# Patient Record
Sex: FEMALE | Race: WHITE | NOT HISPANIC OR LATINO | Employment: OTHER | URBAN - METROPOLITAN AREA
[De-identification: names, ages, dates, MRNs, and addresses within clinical notes are randomized per-mention and may not be internally consistent; named-entity substitution may affect disease eponyms.]

---

## 2017-08-03 ENCOUNTER — OFFICE VISIT (OUTPATIENT)
Dept: ORTHOPEDIC SURGERY | Facility: CLINIC | Age: 57
End: 2017-08-03

## 2017-08-03 VITALS
HEART RATE: 86 BPM | DIASTOLIC BLOOD PRESSURE: 70 MMHG | WEIGHT: 162 LBS | HEIGHT: 64 IN | BODY MASS INDEX: 27.66 KG/M2 | SYSTOLIC BLOOD PRESSURE: 110 MMHG

## 2017-08-03 DIAGNOSIS — Z96.651 STATUS POST TOTAL RIGHT KNEE REPLACEMENT: Primary | ICD-10-CM

## 2017-08-03 DIAGNOSIS — M17.5 OTHER SECONDARY OSTEOARTHRITIS OF LEFT KNEE: ICD-10-CM

## 2017-08-03 PROCEDURE — 20610 DRAIN/INJ JOINT/BURSA W/O US: CPT | Performed by: PHYSICIAN ASSISTANT

## 2017-08-03 PROCEDURE — 99214 OFFICE O/P EST MOD 30 MIN: CPT | Performed by: PHYSICIAN ASSISTANT

## 2017-08-03 RX ORDER — BUPIVACAINE HYDROCHLORIDE 2.5 MG/ML
4 INJECTION, SOLUTION INFILTRATION; PERINEURAL
Status: COMPLETED | OUTPATIENT
Start: 2017-08-03 | End: 2017-08-03

## 2017-08-03 RX ORDER — METHYLPREDNISOLONE ACETATE 40 MG/ML
40 INJECTION, SUSPENSION INTRA-ARTICULAR; INTRALESIONAL; INTRAMUSCULAR; SOFT TISSUE
Status: COMPLETED | OUTPATIENT
Start: 2017-08-03 | End: 2017-08-03

## 2017-08-03 RX ORDER — FOLIC ACID 1 MG/1
1 TABLET ORAL DAILY
COMMUNITY

## 2017-08-03 RX ORDER — LIDOCAINE HYDROCHLORIDE 10 MG/ML
4 INJECTION, SOLUTION INFILTRATION; PERINEURAL
Status: COMPLETED | OUTPATIENT
Start: 2017-08-03 | End: 2017-08-03

## 2017-08-03 RX ADMIN — LIDOCAINE HYDROCHLORIDE 4 ML: 10 INJECTION, SOLUTION INFILTRATION; PERINEURAL at 15:36

## 2017-08-03 RX ADMIN — METHYLPREDNISOLONE ACETATE 40 MG: 40 INJECTION, SUSPENSION INTRA-ARTICULAR; INTRALESIONAL; INTRAMUSCULAR; SOFT TISSUE at 15:36

## 2017-08-03 RX ADMIN — BUPIVACAINE HYDROCHLORIDE 4 ML: 2.5 INJECTION, SOLUTION INFILTRATION; PERINEURAL at 15:36

## 2017-08-03 NOTE — PROGRESS NOTES
Procedure   Large Joint Arthrocentesis  Date/Time: 8/3/2017 3:36 PM  Consent given by: patient  Site marked: site marked  Timeout: Immediately prior to procedure a time out was called to verify the correct patient, procedure, equipment, support staff and site/side marked as required   Supporting Documentation  Indications: pain   Procedure Details  Location: knee - L knee  Preparation: Patient was prepped and draped in the usual sterile fashion  Needle size: 22 G  Approach: anterolateral  Medications administered: 4 mL bupivacaine; 4 mL lidocaine 1 %; 40 mg methylPREDNISolone acetate 40 MG/ML  Patient tolerance: patient tolerated the procedure well with no immediate complications

## 2017-08-03 NOTE — PROGRESS NOTES
Subjective     Pain of the Left Knee and Follow-up of the Right Knee (TKA 6/7/16)      Ashley Hankins is a 56 y.o. female.     History of Present Illness   Patient comes in for routine follow-up right total knee arthroplasties June 2016 with Dr. yudith Herzog and increased left knee pain for several months.  She reports the right knee is doing very well.  She has no pain or problems from that she works in a [employer] with many hours on her feet without any right knee problems.  The left knee has been bothering him for quite some time, however, it is gotten worse other past several months.  She complains of medial and anterior knee pain worse with stairs.  She has functional pain with occasional night pain.  She rates the pain 7 out of 10.  She denies any radiating pain or mechanical symptoms.  She is not had any prior treatment on the left knee.  She does have rheumatoid arthritis treated with methotrexate.  She is taken Celebrex in the past and wonders if she should return to taking it.  Allergies   Allergen Reactions   • Adhesive Tape    • Latex      Current Outpatient Prescriptions on File Prior to Visit   Medication Sig Dispense Refill   • aspirin 325 MG tablet Take 325 mg by mouth daily.     • buPROPion (WELLBUTRIN) 100 MG tablet Take 100 mg by mouth.     • Olmesartan Medoxomil-HCTZ (BENICAR HCT PO) Take 1 tablet by mouth daily.     • traZODone (DESYREL) 100 MG tablet Take 100 mg by mouth every night.     • [DISCONTINUED] acetaminophen (TYLENOL) 325 MG tablet Take 325 mg by mouth every 4 (four) hours as needed for mild pain (1-3).     • [DISCONTINUED] celecoxib (CeleBREX) 100 MG capsule Take 100 mg by mouth daily.     • [DISCONTINUED] cetirizine (ZyrTEC) 10 MG tablet Take 10 mg by mouth daily as needed for allergies.     • [DISCONTINUED] HYDROcodone-acetaminophen (NORCO)  MG per tablet Take 0.5 tablets by mouth every 4 (four) hours as needed for moderate pain (4-6).     • [DISCONTINUED] senna-docusate  "(PERICOLACE) 8.6-50 MG per tablet Take 2 tablets by mouth 3 (three) times a day as needed for constipation.       No current facility-administered medications on file prior to visit.      Social History     Social History   • Marital status:      Spouse name: N/A   • Number of children: N/A   • Years of education: N/A     Occupational History   • Not on file.     Social History Main Topics   • Smoking status: Never Smoker   • Smokeless tobacco: Never Used   • Alcohol use Yes      Comment: occasional   • Drug use: No   • Sexual activity: Defer     Other Topics Concern   • Not on file     Social History Narrative     Past Surgical History:   Procedure Laterality Date   •  SECTION      x2   • FRACTURE SURGERY     • JOINT REPLACEMENT Right     TKA 2016   • KIDNEY STONE SURGERY      removal     Family History   Problem Relation Age of Onset   • Depression Mother    • Hypertension Mother    • Cancer Mother    • Diabetes Mother    • Diabetes Father    • Hypertension Father    • Diabetes Brother      Past Medical History:   Diagnosis Date   • Hypertension    • IBS (irritable bowel syndrome)    • Kidney stones    • Rheumatoid arthritis    • SOB (shortness of breath)          Review of Systems   Constitutional: Positive for fatigue.   HENT: Negative.    Eyes: Negative.    Respiratory: Negative.    Cardiovascular: Positive for palpitations.   Gastrointestinal: Negative.    Endocrine: Negative.    Genitourinary: Negative.    Musculoskeletal: Positive for joint swelling and neck pain.   Skin: Negative.    Allergic/Immunologic: Negative.    Neurological: Negative.    Hematological: Negative.    Psychiatric/Behavioral: Negative.        The following portions of the patient's history were reviewed and updated as appropriate: allergies, current medications, past family history, past medical history, past social history, past surgical history and problem list.    Objective   /70  Pulse 86  Ht 64\" (162.6 cm)  " Wt 162 lb (73.5 kg)  BMI 27.81 kg/m2    Physical Exam:   GENERAL: Body habitus: normal weight for height    Lower extremity edema: Left: none; Right: none    Varicose veins:  Left: none; Right: none    Gait: normal     Mental Status:  awake and alert; oriented to person, place, and time    Voice:  clear  SKIN:  Normal    Hair Growth:  Right:normal; Left:  normal  HEENT: Head: Normocephalic, no lesions, without obvious abnormality.     Eyes: sclera anicteric  PULM:  Repiratory effort normal    Ortho Exam  Left Hip Exam  ---------  FLEXION CONTRACTURE: None  FLEXION: 110 degrees  INTERNAL ROTATION: 20 degrees at 90 degrees of flexion   EXTERNAL ROTATION: 40 degrees at 90 degrees of flexion    PAIN WITH HIP MOTION: no      Left Knee Exam  ----------  Knee Exam:  ----------  ALIGNMENT:  Left: neutral  ----------  RANGE OF MOTION:  Left: 0-120  LIGAMENTOUS STABILITY:   Left:stable to varus and valgus stress at 0 and 30 degrees without any evidence of laxity   ----------  STRENGTH:  KNEE FLEXION  Left 5/5  KNEE EXTENSION Left 5/5  ----------  PAIN WITH PALPATION: Left medial joint line  PAIN WITH KNEE ROM:  Left no  PATELLAR CREPITUS:  Left yes  ----------  SENSATION TO LIGHT TOUCH:  DEEP PERONEAL/SUPERFICIAL PERONEAL/SURAL/SAPHENOUS/TIBIAL:   Left intact  ----------  EDEMA:   Left:  no  ERYTHEMA:  ; Left:  no    Right Hip Exam  ----------  FLEXION CONTRACTURE: None  FLEXION: 110 degrees  INTERNAL ROTATION: 20 degrees at 90 degrees of flexion   EXTERNAL ROTATION: 40 degrees at 90 degrees of flexion    PAIN WITH HIP MOTION: no      Right Knee Exam  ----------  ALIGNMENT: Right: neutral----------  RANGE OF MOTION:  Right: 0-120  LIGAMENTOUS STABILITY:   Right:stable to varus and valgus stress at 0 and 30 degrees without any evidence of laxity----------  STRENGTH:  KNEE FLEXION Right 5/5  KNEE EXTENSION Right 5/5 ----------  PAIN WITH PALPATION: Right denies tenderness to palpation about the knee  PAIN WITH KNEE ROM: Right  no  PATELLAR CREPITUS: Right yes   ----------  SENSATION TO LIGHT TOUCH:  DEEP PERONEAL/SUPERFICIAL PERONEAL/SURAL/SAPHENOUS/TIBIAL:   Right intact----------  EDEMA:  Right:  no;  ERYTHEMA:  Right:  no;      Medical Decision Making    Data Review:   ordered and reviewed x-rays today    Assessment and Plan/ Diagnosis/Treatment options:   1.  Left knee arthritis.  I reviewed x-rays and clinical findings with the patient today.  On exam she has medial joint line tenderness with no evidence of new ligament or meniscal pathology.  I think her increasing left knee pain is secondary to knee arthritis.  She also is a rheumatoid treated with methotrexate.  We discussed treatment options including good shoe wear, ice, NSAIDs, low impact exercise, knee/hip strengthening, weight loss and the 4 fold multiplier on the joint, steroid injection, viscosupplementation and eventually knee replacement.  She has had steroid injection and been through conservative treatment on the right knee prior to surgery.  She would like steroid injection the left knee today.  I told her that we can do these every 3 or 4 months if it gives her good relief.  Plan today is steroid injection into the left knee she will return in 4 months or sooner if needed.    2.  Doing well status post right total knee arthroplasty.  I reviewed x-rays with the patient today.  X-rays show well-positioned total knee arthroplasty with no evidence of osteolysis, subsidence or fracture.  She is having no pain or problems from the right knee.  Plan is continued observation.  She'll return in 2 years with repeat x-rays of the right knee or sooner if needed.    Using sterile technique, the left knee was sterilely prepped with Hibiclens. Following a time out,  using a 22 gauge needle, the left knee was injected with 40mg Depo Medrol, 4 cc lidocaine and 4 cc marcaine.  Patient tolerated the procedure well.  No complications.

## 2017-12-05 ENCOUNTER — OFFICE VISIT (OUTPATIENT)
Dept: ORTHOPEDIC SURGERY | Facility: CLINIC | Age: 57
End: 2017-12-05

## 2017-12-05 DIAGNOSIS — M17.5 OTHER SECONDARY OSTEOARTHRITIS OF LEFT KNEE: Primary | ICD-10-CM

## 2017-12-05 PROBLEM — M19.90 DJD (DEGENERATIVE JOINT DISEASE): Status: ACTIVE | Noted: 2017-12-05

## 2017-12-05 PROCEDURE — 99213 OFFICE O/P EST LOW 20 MIN: CPT | Performed by: PHYSICIAN ASSISTANT

## 2017-12-05 PROCEDURE — 20610 DRAIN/INJ JOINT/BURSA W/O US: CPT | Performed by: PHYSICIAN ASSISTANT

## 2017-12-05 RX ORDER — METHYLPREDNISOLONE ACETATE 40 MG/ML
40 INJECTION, SUSPENSION INTRA-ARTICULAR; INTRALESIONAL; INTRAMUSCULAR; SOFT TISSUE
Status: COMPLETED | OUTPATIENT
Start: 2017-12-05 | End: 2017-12-05

## 2017-12-05 RX ORDER — LIDOCAINE HYDROCHLORIDE 10 MG/ML
4 INJECTION, SOLUTION INFILTRATION; PERINEURAL
Status: COMPLETED | OUTPATIENT
Start: 2017-12-05 | End: 2017-12-05

## 2017-12-05 RX ADMIN — LIDOCAINE HYDROCHLORIDE 4 ML: 10 INJECTION, SOLUTION INFILTRATION; PERINEURAL at 10:48

## 2017-12-05 RX ADMIN — METHYLPREDNISOLONE ACETATE 40 MG: 40 INJECTION, SUSPENSION INTRA-ARTICULAR; INTRALESIONAL; INTRAMUSCULAR; SOFT TISSUE at 10:48

## 2017-12-05 NOTE — PROGRESS NOTES
[facility] Note    Subjective     Patient: Ashley Hankins  : 1960    Primary Care Provider: Nasima Cota MD    Requesting Provider: As above    Pain of the Left Knee      History    Chief Complaint: Left knee pain    History of Present Illness: Patient returns today to discuss her increasing left knee pain.  She reports no new symptoms.  She complains of mainly medial functional pain with occasional night pain.  She had steroid injection in 2017 which gave her good relief for 3 months.  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  She rates the pain to be 5/10 with no radiating pain or mechanical symptoms.  She has recently run out of her methotrexate for her RA and continues to take intermittent ibuprofen.  She is not interested in replacement and like repeat injection today.  She denies any fever chills or constitutional symptoms.    Current Outpatient Prescriptions on File Prior to Visit   Medication Sig Dispense Refill   • aspirin 325 MG tablet Take 325 mg by mouth daily.     • buPROPion (WELLBUTRIN) 100 MG tablet Take 100 mg by mouth.     • celecoxib (CeleBREX) 200 MG capsule Take  by mouth Take As Directed.     • folic acid (FOLVITE) 1 MG tablet Take 1 mg by mouth Daily.     • methotrexate 2.5 MG tablet Take 2.5 mg by mouth. 7 on      • Olmesartan Medoxomil-HCTZ (BENICAR HCT PO) Take 1 tablet by mouth daily.     • traZODone (DESYREL) 100 MG tablet Take 100 mg by mouth every night.       No current facility-administered medications on file prior to visit.       Allergies   Allergen Reactions   • Adhesive Tape    • Latex       Past Medical History:   Diagnosis Date   • Carpal tunnel syndrome of right wrist    • Hypertension    • IBS (irritable bowel syndrome)    • Kidney stones    • Primary osteoarthritis of both knees    • Rheumatoid arthritis    • SOB (shortness of breath)      Past Surgical  History:   Procedure Laterality Date   •  SECTION      x2   • FRACTURE SURGERY     • JOINT REPLACEMENT Right     TKA 2016   • KIDNEY STONE SURGERY      removal     Family History   Problem Relation Age of Onset   • Depression Mother    • Hypertension Mother    • Cancer Mother    • Diabetes Mother    • Diabetes Father    • Hypertension Father    • Heart attack Father    • Diabetes Brother       Social History     Social History   • Marital status:      Spouse name: N/A   • Number of children: N/A   • Years of education: N/A     Occupational History   • Not on file.     Social History Main Topics   • Smoking status: Never Smoker   • Smokeless tobacco: Never Used   • Alcohol use Yes      Comment: occasional   • Drug use: No   • Sexual activity: Defer     Other Topics Concern   • Not on file     Social History Narrative        Review of Systems   Constitutional: Negative.    HENT: Negative.    Eyes: Negative.    Respiratory: Negative.    Cardiovascular: Negative.    Gastrointestinal: Negative.    Endocrine: Negative.    Genitourinary: Negative.    Musculoskeletal: Negative.         Joint Pain    Skin: Negative.    Allergic/Immunologic: Negative.    Neurological: Negative.    Hematological: Negative.    Psychiatric/Behavioral: Negative.        The following portions of the patient's history were reviewed and updated as appropriate: allergies, current medications, past family history, past medical history, past social history, past surgical history and problem list.      Objective      Physical Exam  There were no vitals taken for this visit.    There is no height or weight on file to calculate BMI.    GENERAL: Body habitus: normal weight for height    Lower extremity edema: Left: trace; Right: trace    Varicose veins:  Left: mild; Right: mild    Gait: normal     Mental Status:  awake and alert; oriented to person, place, and time    Voice:  clear  SKIN:  Normal    Hair Growth:  Right:normal; Left:   normal  HEENT: Head: Normocephalic, atraumatic,  without obvious abnormality.  eye: normal external eye, no icterus   PULM:  Repiratory effort normal    Ortho Exam  Left Knee Exam  ----------  Knee Exam:  ----------  ALIGNMENT:  Left: neutral  ----------  RANGE OF MOTION:  Left: Normal (0-130 degrees) with no extensor lag or flexion contracture  LIGAMENTOUS STABILITY:   Left:stable to varus and valgus stress at terminal extension and 30 degrees without any evidence of laxity   ----------  STRENGTH:  KNEE FLEXION  Left 5/5  KNEE EXTENSION Left 5/5  ----------  PAIN WITH PALPATION: Left medial joint line  PAIN WITH KNEE ROM:  Left no  PATELLAR CREPITUS:  Left yes  ----------  SENSATION TO LIGHT TOUCH:  DEEP PERONEAL/SUPERFICIAL PERONEAL/SURAL/SAPHENOUS/TIBIAL:   Left intact  ----------  EDEMA:   Left:  no  ERYTHEMA:  ; Left:  no  WOUNDS/INCISIONS: none, no overlying skin problems.      Medical Decision Making    Data Review:   ordered and reviewed x-rays today    Assessment:  1. Other secondary osteoarthritis of left knee        Plan:  1. Doing well with nonoperative treatment of left knee arthritis.  I reviewed clinical findings past and current treatment with the patient today.  On exam, she has medial joint line tenderness with no evidence of new ligament or meniscal pathology.  She's done well in the past with steroid injection which gave her 3 months relief.  I encouraged her to continue to call her rheumatologist for refill of her methotrexate so she doesn't get a flare of her RA.  Plan today is repeat steroid injection into the left knee.  She will return in 4 months or sooner if needed.    Using sterile technique, the left knee was sterilely prepped with Hibiclens. Following a time out,  using a 22 gauge needle, the left knee was injected with 40mg Depo Medrol, 4 cc lidocaine and 4 cc marcaine.  Patient tolerated the procedure well.  No complications.        Adrianne Ramirez PA-C  12/05/17  2:35 PM

## 2017-12-05 NOTE — PROGRESS NOTES
Procedure   Large Joint Arthrocentesis  Date/Time: 12/5/2017 10:48 AM  Consent given by: patient  Site marked: site marked  Timeout: Immediately prior to procedure a time out was called to verify the correct patient, procedure, equipment, support staff and site/side marked as required   Supporting Documentation  Indications: pain   Procedure Details  Location: knee - L knee  Preparation: Patient was prepped and draped in the usual sterile fashion  Needle size: 22 G  Approach: anterolateral  Medications administered: 4 mL lidocaine 1 %; 40 mg methylPREDNISolone acetate 40 MG/ML (c Bupivicaine .25% NDC 55150-167-10, Lot [id number], exp [id number])  Patient tolerance: patient tolerated the procedure well with no immediate complications

## 2018-05-01 ENCOUNTER — OFFICE VISIT (OUTPATIENT)
Dept: ORTHOPEDIC SURGERY | Facility: CLINIC | Age: 58
End: 2018-05-01

## 2018-05-01 VITALS
WEIGHT: 164 LBS | DIASTOLIC BLOOD PRESSURE: 74 MMHG | SYSTOLIC BLOOD PRESSURE: 144 MMHG | HEIGHT: 64 IN | BODY MASS INDEX: 28 KG/M2 | HEART RATE: 74 BPM

## 2018-05-01 DIAGNOSIS — M17.5 OTHER SECONDARY OSTEOARTHRITIS OF LEFT KNEE: Primary | ICD-10-CM

## 2018-05-01 PROCEDURE — 99213 OFFICE O/P EST LOW 20 MIN: CPT | Performed by: PHYSICIAN ASSISTANT

## 2018-05-01 PROCEDURE — 20610 DRAIN/INJ JOINT/BURSA W/O US: CPT | Performed by: PHYSICIAN ASSISTANT

## 2018-05-01 RX ORDER — BUPROPION HYDROCHLORIDE 150 MG/1
150 TABLET ORAL DAILY
Refills: 5 | COMMUNITY
Start: 2018-04-23

## 2018-05-01 RX ORDER — ALPRAZOLAM 1 MG/1
1 TABLET ORAL DAILY
Refills: 2 | COMMUNITY
Start: 2018-04-07

## 2018-05-01 RX ORDER — BUPIVACAINE HYDROCHLORIDE 2.5 MG/ML
4 INJECTION, SOLUTION INFILTRATION; PERINEURAL
Status: COMPLETED | OUTPATIENT
Start: 2018-05-01 | End: 2018-05-01

## 2018-05-01 RX ORDER — TRIAMCINOLONE ACETONIDE 40 MG/ML
40 INJECTION, SUSPENSION INTRA-ARTICULAR; INTRAMUSCULAR
Status: COMPLETED | OUTPATIENT
Start: 2018-05-01 | End: 2018-05-01

## 2018-05-01 RX ORDER — LIDOCAINE HYDROCHLORIDE 10 MG/ML
4 INJECTION, SOLUTION INFILTRATION; PERINEURAL
Status: COMPLETED | OUTPATIENT
Start: 2018-05-01 | End: 2018-05-01

## 2018-05-01 RX ADMIN — TRIAMCINOLONE ACETONIDE 40 MG: 40 INJECTION, SUSPENSION INTRA-ARTICULAR; INTRAMUSCULAR at 14:56

## 2018-05-01 RX ADMIN — LIDOCAINE HYDROCHLORIDE 4 ML: 10 INJECTION, SOLUTION INFILTRATION; PERINEURAL at 14:56

## 2018-05-01 RX ADMIN — BUPIVACAINE HYDROCHLORIDE 4 ML: 2.5 INJECTION, SOLUTION INFILTRATION; PERINEURAL at 14:56

## 2018-05-01 NOTE — PROGRESS NOTES
[facility] [place] [facility] Note    Subjective     Patient: Ashley Hankins  : 1960    Primary Care Provider: Nasima Cota MD    Requesting Provider: As above    Follow-up of the Left Knee (5 MONTHS/)      History    Chief Complaint: Left knee pain    History of Present Illness: Patient returns today for increasing left knee pain.  She is rheumatoid with known left knee arthritis.  She reports increasing pain for 3 weeks.  She reports she is started a new exercise program is been doing lower body and upper body routines for some time 3 days a week.  She reports no new symptoms.  She complains of medial functional pain with no night pain.  She reports last steroid injection in 17 lasted at least 4 months.  She is out of her methotrexate and Celebrex.    Current Outpatient Prescriptions on File Prior to Visit   Medication Sig Dispense Refill   • aspirin 325 MG tablet Take 325 mg by mouth daily.     • celecoxib (CeleBREX) 200 MG capsule Take  by mouth Take As Directed.     • folic acid (FOLVITE) 1 MG tablet Take 1 mg by mouth Daily.     • methotrexate 2.5 MG tablet Take 2.5 mg by mouth. 7 on      • Olmesartan Medoxomil-HCTZ (BENICAR HCT PO) Take 1 tablet by mouth daily.     • traZODone (DESYREL) 100 MG tablet Take 100 mg by mouth every night.     • [DISCONTINUED] buPROPion (WELLBUTRIN) 100 MG tablet Take 100 mg by mouth.       No current facility-administered medications on file prior to visit.       Allergies   Allergen Reactions   • Adhesive Tape    • Latex       Past Medical History:   Diagnosis Date   • Carpal tunnel syndrome of right wrist    • Hypertension    • IBS (irritable bowel syndrome)    • Kidney stones    • Primary osteoarthritis of both knees    • Rheumatoid arthritis    • SOB (shortness of breath)      Past Surgical History:   Procedure Laterality Date   •  SECTION      x2   • FRACTURE SURGERY     • JOINT REPLACEMENT Right     TKA 2016   • KIDNEY STONE  "SURGERY      removal     Family History   Problem Relation Age of Onset   • Depression Mother    • Hypertension Mother    • Cancer Mother    • Diabetes Mother    • Diabetes Father    • Hypertension Father    • Heart attack Father    • Diabetes Brother       Social History     Social History   • Marital status:      Spouse name: N/A   • Number of children: N/A   • Years of education: N/A     Occupational History   • Not on file.     Social History Main Topics   • Smoking status: Never Smoker   • Smokeless tobacco: Never Used   • Alcohol use Yes      Comment: occasional   • Drug use: No   • Sexual activity: Defer     Other Topics Concern   • Not on file     Social History Narrative   • No narrative on file        Review of Systems    The following portions of the patient's history were reviewed and updated as appropriate: allergies, current medications, past family history, past medical history, past social history, past surgical history and problem list.      Objective      Physical Exam  /74   Pulse 74   Ht 162.6 cm (64.02\")   Wt 74.4 kg (164 lb)   BMI 28.14 kg/m²     Body mass index is 28.14 kg/m².    GENERAL: Body habitus: normal weight for height    Lower extremity edema: Left: trace; Right: trace    Varicose veins:  Left: mild; Right: mild    Gait: normal     Mental Status:  awake and alert; oriented to person, place, and time    Voice:  clear  SKIN:  Normal    Hair Growth:  Right:normal; Left:  normal  HEENT: Head: Normocephalic, atraumatic,  without obvious abnormality.  eye: normal external eye, no icterus   PULM:  Repiratory effort normal    Ortho Exam  Left Hip Exam  ---------  FLEXION CONTRACTURE: None  FLEXION: 110 degrees  INTERNAL ROTATION: 20 degrees at 90 degrees of flexion   EXTERNAL ROTATION: 40 degrees at 90 degrees of flexion    PAIN WITH HIP MOTION: no      Left Knee Exam  ----------  Knee Exam:  ----------  ALIGNMENT:  Left: neutral  ----------  RANGE OF MOTION:  Left: Normal " (0-130 degrees) with no extensor lag or flexion contracture  LIGAMENTOUS STABILITY:   Left:stable to varus and valgus stress at terminal extension and 30 degrees without any evidence of laxity   ----------  STRENGTH:  KNEE FLEXION  Left 5/5  KNEE EXTENSION Left 5/5  ----------  PAIN WITH PALPATION: Left medial joint line  PAIN WITH KNEE ROM:  Left no  PATELLAR CREPITUS:  Left no  ----------  SENSATION TO LIGHT TOUCH:  DEEP PERONEAL/SUPERFICIAL PERONEAL/SURAL/SAPHENOUS/TIBIAL:   Left intact  ----------  EFFUSION:   Left:  no  ERYTHEMA:  ; Left:  no  WOUNDS/INCISIONS: none, no overlying skin problems.      Medical Decision Making    Data Review:   none    Assessment:  1. Other secondary osteoarthritis of left knee        Plan:  Left knee arthritis in the face of rheumatoid arthritis.  I reviewed clinical findings past and current treatment the patient.  Patient reports 4+ months relief from prior steroid injection.  She is not interested in replacement.  She would like to continue with repeat injection.  I encouraged her to return to her rheumatologist for refills of her Celebrex folic acid methotrexate.  Plan today is steroid injection in the left knee.  She'll return as needed.    Using sterile technique, the left knee was sterilely prepped with Hibiclens. Following a time out,  using a 22 gauge needle, the left knee was injected with 40mg Depo Medrol, 4 cc lidocaine and 4 cc marcaine.  Patient tolerated the procedure well.  No complications.        Adrianne Ramirez PA-C  05/01/18  3:45 PM

## 2018-05-01 NOTE — PROGRESS NOTES
Procedure   Large Joint Arthrocentesis  Date/Time: 5/1/2018 2:56 PM  Consent given by: patient  Site marked: site marked  Timeout: Immediately prior to procedure a time out was called to verify the correct patient, procedure, equipment, support staff and site/side marked as required   Supporting Documentation  Indications: pain   Procedure Details  Location: knee - L knee  Preparation: Patient was prepped and draped in the usual sterile fashion  Needle size: 22 G  Approach: anterolateral  Medications administered: 4 mL bupivacaine 0.25 %; 4 mL lidocaine 1 %; 40 mg triamcinolone acetonide 40 MG/ML  Patient tolerance: patient tolerated the procedure well with no immediate complications

## 2021-02-25 DIAGNOSIS — Z23 IMMUNIZATION DUE: ICD-10-CM

## 2024-05-28 ENCOUNTER — TELEPHONE (OUTPATIENT)
Age: 64
End: 2024-05-28
Payer: COMMERCIAL

## 2024-05-28 NOTE — TELEPHONE ENCOUNTER
Caller: Ashley Hankins    Relationship: Self    Best call back number:  [phone]    What is the best time to reach you: ANY    Who are you requesting to speak with (clinical staff, provider,  specific staff member): NO    Do you know the name of the person who called: NO    What was the call regarding: APPT    Is it okay if the provider responds through MyChart: NO, WOULD LIKE A CALL BACK

## 2024-05-30 PROBLEM — M06.9 RHEUMATOID ARTHRITIS: Status: ACTIVE | Noted: 2024-05-30

## 2024-06-03 ENCOUNTER — LAB (OUTPATIENT)
Facility: HOSPITAL | Age: 64
End: 2024-06-03
Payer: COMMERCIAL

## 2024-06-03 ENCOUNTER — OFFICE VISIT (OUTPATIENT)
Age: 64
End: 2024-06-03
Payer: COMMERCIAL

## 2024-06-03 ENCOUNTER — SPECIALTY PHARMACY (OUTPATIENT)
Age: 64
End: 2024-06-03
Payer: COMMERCIAL

## 2024-06-03 VITALS
SYSTOLIC BLOOD PRESSURE: 112 MMHG | DIASTOLIC BLOOD PRESSURE: 62 MMHG | HEIGHT: 64 IN | HEART RATE: 77 BPM | TEMPERATURE: 98.3 F | BODY MASS INDEX: 28.53 KG/M2 | WEIGHT: 167.1 LBS

## 2024-06-03 DIAGNOSIS — M05.79 RHEUMATOID ARTHRITIS INVOLVING MULTIPLE SITES WITH POSITIVE RHEUMATOID FACTOR: ICD-10-CM

## 2024-06-03 DIAGNOSIS — M85.89 OSTEOPENIA OF MULTIPLE SITES: ICD-10-CM

## 2024-06-03 DIAGNOSIS — M05.79 RHEUMATOID ARTHRITIS INVOLVING MULTIPLE SITES WITH POSITIVE RHEUMATOID FACTOR: Primary | ICD-10-CM

## 2024-06-03 DIAGNOSIS — Z79.899 HIGH RISK MEDICATION USE: ICD-10-CM

## 2024-06-03 DIAGNOSIS — D84.821 IMMUNOSUPPRESSION DUE TO DRUG THERAPY: ICD-10-CM

## 2024-06-03 DIAGNOSIS — Z79.899 IMMUNOSUPPRESSION DUE TO DRUG THERAPY: ICD-10-CM

## 2024-06-03 DIAGNOSIS — R53.82 CHRONIC FATIGUE: ICD-10-CM

## 2024-06-03 DIAGNOSIS — R76.8 POSITIVE ANA (ANTINUCLEAR ANTIBODY): ICD-10-CM

## 2024-06-03 DIAGNOSIS — M17.0 PRIMARY OSTEOARTHRITIS OF BOTH KNEES: ICD-10-CM

## 2024-06-03 LAB
ALBUMIN SERPL-MCNC: 4.2 G/DL (ref 3.5–5.2)
ALBUMIN/GLOB SERPL: 1.7 G/DL
ALP SERPL-CCNC: 65 U/L (ref 39–117)
ALT SERPL W P-5'-P-CCNC: 22 U/L (ref 1–33)
ANION GAP SERPL CALCULATED.3IONS-SCNC: 10.2 MMOL/L (ref 5–15)
AST SERPL-CCNC: 26 U/L (ref 1–32)
BASOPHILS # BLD AUTO: 0.03 10*3/MM3 (ref 0–0.2)
BASOPHILS NFR BLD AUTO: 0.4 % (ref 0–1.5)
BILIRUB SERPL-MCNC: 0.6 MG/DL (ref 0–1.2)
BUN SERPL-MCNC: 15 MG/DL (ref 8–23)
BUN/CREAT SERPL: 21.1 (ref 7–25)
CALCIUM SPEC-SCNC: 9 MG/DL (ref 8.6–10.5)
CHLORIDE SERPL-SCNC: 102 MMOL/L (ref 98–107)
CO2 SERPL-SCNC: 27.8 MMOL/L (ref 22–29)
CREAT SERPL-MCNC: 0.71 MG/DL (ref 0.57–1)
CRP SERPL-MCNC: <0.3 MG/DL (ref 0–0.5)
DEPRECATED RDW RBC AUTO: 45.7 FL (ref 37–54)
EGFRCR SERPLBLD CKD-EPI 2021: 95.7 ML/MIN/1.73
EOSINOPHIL # BLD AUTO: 0.16 10*3/MM3 (ref 0–0.4)
EOSINOPHIL NFR BLD AUTO: 2.3 % (ref 0.3–6.2)
ERYTHROCYTE [DISTWIDTH] IN BLOOD BY AUTOMATED COUNT: 13.3 % (ref 12.3–15.4)
ERYTHROCYTE [SEDIMENTATION RATE] IN BLOOD: 8 MM/HR (ref 0–30)
GLOBULIN UR ELPH-MCNC: 2.5 GM/DL
GLUCOSE SERPL-MCNC: 99 MG/DL (ref 65–99)
HCT VFR BLD AUTO: 41.3 % (ref 34–46.6)
HGB BLD-MCNC: 13.6 G/DL (ref 12–15.9)
IMM GRANULOCYTES # BLD AUTO: 0.01 10*3/MM3 (ref 0–0.05)
IMM GRANULOCYTES NFR BLD AUTO: 0.1 % (ref 0–0.5)
LYMPHOCYTES # BLD AUTO: 2.14 10*3/MM3 (ref 0.7–3.1)
LYMPHOCYTES NFR BLD AUTO: 31 % (ref 19.6–45.3)
MCH RBC QN AUTO: 30.9 PG (ref 26.6–33)
MCHC RBC AUTO-ENTMCNC: 32.9 G/DL (ref 31.5–35.7)
MCV RBC AUTO: 93.9 FL (ref 79–97)
MONOCYTES # BLD AUTO: 0.65 10*3/MM3 (ref 0.1–0.9)
MONOCYTES NFR BLD AUTO: 9.4 % (ref 5–12)
NEUTROPHILS NFR BLD AUTO: 3.92 10*3/MM3 (ref 1.7–7)
NEUTROPHILS NFR BLD AUTO: 56.8 % (ref 42.7–76)
NRBC BLD AUTO-RTO: 0 /100 WBC (ref 0–0.2)
PLATELET # BLD AUTO: 340 10*3/MM3 (ref 140–450)
PMV BLD AUTO: 9.6 FL (ref 6–12)
POTASSIUM SERPL-SCNC: 3.5 MMOL/L (ref 3.5–5.2)
PROT SERPL-MCNC: 6.7 G/DL (ref 6–8.5)
RBC # BLD AUTO: 4.4 10*6/MM3 (ref 3.77–5.28)
SODIUM SERPL-SCNC: 140 MMOL/L (ref 136–145)
WBC NRBC COR # BLD AUTO: 6.91 10*3/MM3 (ref 3.4–10.8)

## 2024-06-03 PROCEDURE — 85025 COMPLETE CBC W/AUTO DIFF WBC: CPT

## 2024-06-03 PROCEDURE — 36415 COLL VENOUS BLD VENIPUNCTURE: CPT

## 2024-06-03 PROCEDURE — 80053 COMPREHEN METABOLIC PANEL: CPT

## 2024-06-03 PROCEDURE — 85652 RBC SED RATE AUTOMATED: CPT

## 2024-06-03 PROCEDURE — 86140 C-REACTIVE PROTEIN: CPT

## 2024-06-03 PROCEDURE — 99214 OFFICE O/P EST MOD 30 MIN: CPT | Performed by: INTERNAL MEDICINE

## 2024-06-03 RX ORDER — FOLIC ACID 1 MG/1
1 TABLET ORAL DAILY
Qty: 30 TABLET | Refills: 3 | Status: SHIPPED | OUTPATIENT
Start: 2024-06-03

## 2024-06-03 RX ORDER — METHOTREXATE 2.5 MG/1
17.5 TABLET ORAL WEEKLY
Qty: 35 TABLET | Refills: 3 | Status: SHIPPED | OUTPATIENT
Start: 2024-06-03

## 2024-06-03 RX ORDER — CERTOLIZUMAB PEGOL 400 MG
200 KIT SUBCUTANEOUS
Qty: 2 EACH | Refills: 3 | Status: SHIPPED | OUTPATIENT
Start: 2024-06-03

## 2024-06-03 NOTE — ASSESSMENT & PLAN NOTE
Onset 2009. RF, CCP, and SANTHOSH +. Methotrexate started by primary care provider 2016.  Cimzia started 9/2022.  .  RA is doing well with Cimzia.    Tender joint count is 0 , swollen joint count is 0 , physician global is 1 , visual analog pain scale is 2 , pt global is 2.  CDAI is 3-low disease activity.  Continue MTX and q 6 week labs  Continue Cimzia.   F/u in 3 months

## 2024-06-03 NOTE — PROGRESS NOTES
Office Follow Up      Date: 06/03/2024   Patient Name: Ashley Hankins  MRN: [id number]  YOB: 1960    Referring Physician: Tonia Romo APRN     Chief Complaint:   Chief Complaint   Patient presents with    Rheumatoid Arthritis       History of Present Illness: Ashley Hankins is a 63 y.o. female who is here today for follow up on rheumatoid arthritis.  Doing well on injectable Cimzia and MTX.   She is tolerating Cimzia. No joint swelling.  No SE's or infections.  No problems with MTX. Stopped Celebrex without worsening.    diagnosed with pancreatic cancer. Had surgery 7/20/23.  He has liver mets and is on Keytruda.  Pain scale: 2/10. The problem has not changed. The primary symptoms reported include: pain and stiffness. The following symptoms are not reported:  swelling. The patient assesses the interval disease activity as: stable. The patient's assessment of treatment is: helping greatly. The locations affected since last visit are bilateral knee. Associated symptoms include AM stiffness (5 Minutes). Pertinent negatives include fever, infection, fatigue, eye symptoms, change in vision, sicca complaints, mouth sores/lesions, skin lesion(s), chest pain, changing cough, edema, joint swelling and abdominal pain.   Subjective   Review of Systems: Review of Systems   Constitutional:  Negative for chills, fatigue, fever and unexpected weight loss.   HENT:  Negative for dental problem, mouth sores, sinus pressure and swollen glands.    Eyes:  Negative for photophobia, pain and redness.   Respiratory:  Negative for apnea, cough, chest tightness and shortness of breath.    Cardiovascular:  Negative for chest pain and leg swelling.   Gastrointestinal:  Negative for abdominal pain, diarrhea, nausea and GERD.   Genitourinary:  Negative for dysuria and hematuria.   Musculoskeletal:  Positive for back pain.        Joint pain  Morning stiffness   Skin:  Negative for dry skin,  rash, skin lesions and bruise.   Neurological:  Negative for dizziness, seizures, syncope, weakness, headache and memory problem.   Hematological:  Negative for adenopathy. Does not bruise/bleed easily.   Psychiatric/Behavioral:  Positive for sleep disturbance. Negative for suicidal ideas, depressed mood and stress. The patient is not nervous/anxious.    All other systems reviewed and are negative.       Medications:   Current Outpatient Medications:     ALPRAZolam (XANAX) 1 MG tablet, Take 1 tablet by mouth Daily., Disp: , Rfl: 2    atorvastatin (LIPITOR) 10 MG tablet, Take 0.5 tablets by mouth Daily., Disp: , Rfl:     buPROPion XL (WELLBUTRIN XL) 150 MG 24 hr tablet, Take 1 tablet by mouth Daily. as directed, Disp: , Rfl: 5    Certolizumab Pegol (Cimzia) 2 X 200 MG kit injection, Inject 1 mL under the skin into the appropriate area as directed Every 14 (Fourteen) Days. Administer CIMZIA 400 mg (200 mgmg SQ week 0, 2, 4 then every 4 weeks, Disp: 2 each, Rfl: 3    folic acid (FOLVITE) 1 MG tablet, Take 1 tablet by mouth Daily., Disp: 30 tablet, Rfl: 3    methotrexate 2.5 MG tablet, Take 7 tablets by mouth 1 (One) Time Per Week. 7 on Sunday, Disp: 35 tablet, Rfl: 3    Olmesartan Medoxomil-HCTZ (BENICAR HCT PO), Take 1 tablet by mouth daily., Disp: , Rfl:     traZODone (DESYREL) 100 MG tablet, Take 0.5 tablets by mouth Every Night., Disp: , Rfl:     aspirin 325 MG tablet, Take 1 tablet by mouth Daily., Disp: , Rfl:     Allergies:   Allergies   Allergen Reactions    Adhesive Tape Hives     Pt states that she had this when she was pregnant years ago     Latex Hives     Pt states that she had this when she was pregnant years ago          I have reviewed and updated the patient's chief complaint, history of present illness, review of systems, past medical history, surgical history, family history, social history, medications and allergy list as appropriate.     Objective    Vital Signs:   Vitals:    06/03/24 0851   BP:  "112/62   Pulse: 77   Temp: 98.3 °F (36.8 °C)   Weight: 75.8 kg (167 lb 1.6 oz)   Height: 162.6 cm (64.02\")   PainSc:   2   PainLoc: Knee  Comment: Bilateral     Body mass index is 28.67 kg/m².    Physical Exam:  GENERAL:  The patient is well-developed and well nourished.  Cooperative and oriented x3.  Affect is normal.  Hydration appears normal.  HEENT:  Normocephalic and atraumatic.  No notable alopecia.  No facial rash.  Lids and conjunctiva are normal.  Pupils are equal and sclerae are clear.  I see no oral or nasal ulcers.  Lips, teeth, and gums are within normal limits.  Oropharynx is clear.  NECK:  Neck is supple without adenopathy, masses, or thyromegaly.  CARDIOVASCULAR:  Normal S1, S2.  No murmurs are heard.  LUNGS:  Clear to auscultation and percussion.  ABDOMEN: Soft and nontender without HSM.   EXTREMITIES:  No edema.  No cyanosis or clubbing.   SKIN:  Palpation and inspection are normal.  She has no psoriatic lesions, nodules, nail pits, or rashes.  NEUROLOGIC:  Gait is normal.    MUSCULOSKELETAL:  Complete joint exam was performed.  There was full range of motion of the shoulders, elbows, wrists, and hands without notable deformities, soft tissue swelling, synovitis, or atrophy except as noted. Minimal degenerative changes are seen in the fingers.  Hips have good flexion and internal and external rotation.  Knees have no palpable effusions.  There is a well-healed right anterior knee scar from total joint replacement.  Left knee has slight limitation of flexion but full extension.  She has mild pain at full flexion.  Ankles have no soft tissue swelling or synovitis or major deformities.   BACK:  Straight without scoliosis.  Joint Exam 06/03/2024     The following joints were examined and normal:   Left Glenohumeral, Right Glenohumeral, Left Elbow, Right Elbow, Left Wrist, Right Wrist, Left MCP 1, Right MCP 1, Left MCP 2, Right MCP 2, Left MCP 3, Right MCP 3, Left MCP 4, Right MCP 4, Left MCP 5, Right " MCP 5, Left IP (thumb), Right IP (thumb), Left PIP 2 (finger), Right PIP 2 (finger), Left PIP 3 (finger), Right PIP 3 (finger), Left PIP 4 (finger), Right PIP 4 (finger), Left PIP 5 (finger), Right PIP 5 (finger), Left Knee, Right Knee       Patient Global: 2 mm  Provider Global: 1 mm    Assessment / Plan      Assessment & Plan  Rheumatoid arthritis involving multiple sites with positive rheumatoid factor  Onset 2009. RF, CCP, and SANTHOSH +. Methotrexate started by primary care provider 2016.  Cimzia started 9/2022.  .  RA is doing well with Cimzia.    Tender joint count is 0 , swollen joint count is 0 , physician global is 1 , visual analog pain scale is 2 , pt global is 2.  CDAI is 3-low disease activity.  Continue MTX and q 6 week labs  Continue Cimzia.   F/u in 3 months  Primary osteoarthritis of both knees  Status post right total knee replacement 2017.  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  High risk medication use  Methotrexate.  She understands the need for every 6 to 8-week labs.  Positive SANTHOSH (antinuclear antibody)  No evidence of associated disease.  Osteopenia of multiple sites  Followed by her PCP.  She is on alendronate.  Immunosuppression due to drug therapy  Cimzia.  No infections.  Chronic fatigue      Orders Placed This Encounter   Procedures    CBC Auto Differential    Comprehensive Metabolic Panel    C-reactive Protein    Sedimentation Rate     New Medications Ordered This Visit   Medications    Certolizumab Pegol (Cimzia) 2 X 200 MG kit injection     Sig: Inject 1 mL under the skin into the appropriate area as directed Every 14 (Fourteen) Days. Administer CIMZIA 400 mg (200 mgmg SQ week 0, 2, 4 then every 4 weeks     Dispense:  2 each     Refill:  3    folic acid (FOLVITE) 1 MG tablet     Sig: Take 1 tablet by mouth Daily.     Dispense:  30 tablet     Refill:  3    methotrexate 2.5 MG tablet     Sig: Take 7 tablets by mouth 1 (One) Time Per Week. 7 on Sunday      Dispense:  35 tablet     Refill:  3          Follow Up:   Return in about 3 months (around 9/3/2024).        Arturo Luna MD  [facility]

## 2024-06-03 NOTE — ASSESSMENT & PLAN NOTE
Status post right total knee replacement 2017.  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   WDL

## 2024-06-04 ENCOUNTER — SPECIALTY PHARMACY (OUTPATIENT)
Dept: GENERAL RADIOLOGY | Facility: HOSPITAL | Age: 64
End: 2024-06-04
Payer: COMMERCIAL

## 2024-06-04 RX ORDER — CERTOLIZUMAB PEGOL 400 MG
400 KIT SUBCUTANEOUS ONCE
Qty: 1 EACH | Refills: 5 | Status: SHIPPED | OUTPATIENT
Start: 2024-06-04 | End: 2024-06-06

## 2024-06-04 RX ORDER — CERTOLIZUMAB PEGOL 400 MG
400 KIT SUBCUTANEOUS ONCE
Qty: 1 EACH | Refills: 5 | Status: SHIPPED | OUTPATIENT
Start: 2024-06-04 | End: 2024-06-04 | Stop reason: SDUPTHER

## 2024-06-04 NOTE — PROGRESS NOTES
Specialty Pharmacy Patient Management Program  Rheumatology Initial Assessment     Ashley Hankins was referred by an Rheumatology provider to the Rheumatology Patient Management program offered by [facility] for Rheumatoid Arthritis on 06/04/24.  An initial outreach was conducted, including assessment of therapy appropriateness and specialty medication education for Cimzia. The patient was introduced to services offered by [facility], including: regular assessments, refill coordination, curbside pick-up or mail order delivery options, prior authorization maintenance, and financial assistance programs as applicable. The patient was also provided with contact information for the pharmacy team.     Insurance Coverage & Financial Support  [employer]      Relevant Past Medical History and Comorbidities  Relevant medical history and concomitant health conditions were discussed with the patient. The patient's chart has been reviewed for relevant past medical history and comorbid conditions and updated as necessary.  Past Medical History:   Diagnosis Date    Carpal tunnel syndrome of right wrist     GERD (gastroesophageal reflux disease)     High cholesterol     Hypertension     IBS (irritable bowel syndrome)     Kidney stones     Primary osteoarthritis of both knees     Rheumatoid arthritis     SOB (shortness of breath)      Social History     Socioeconomic History    Marital status:    Tobacco Use    Smoking status: Never     Passive exposure: Never    Smokeless tobacco: Never   Vaping Use    Vaping status: Never Used   Substance and Sexual Activity    Alcohol use: Yes     Comment: occasional    Drug use: No    Sexual activity: Defer       Problem list reviewed by Gianni Diego, PharmD on 6/4/2024 at 10:37 AM    Allergies  Known allergies and reactions were discussed with the patient. The patient's chart has been reviewed for  allergy information and  "updated as necessary.   Allergies   Allergen Reactions    Adhesive Tape Hives     Pt states that she had this when she was pregnant years ago     Latex Hives     Pt states that she had this when she was pregnant years ago          Allergies reviewed by Gianni Diego, [place] on 6/4/2024 at 10:36 AM    Relevant Laboratory Values  Relevant laboratory values were discussed with the patient. The following specialty medication dose adjustment(s) are recommended: n/a    Lab Results   Component Value Date    HGBA1C 5.4 05/26/2016     Lab Results   Component Value Date    GLUCOSE 99 06/03/2024    CALCIUM 9.0 06/03/2024     06/03/2024    K 3.5 06/03/2024    CO2 27.8 06/03/2024     06/03/2024    BUN 15 06/03/2024    CREATININE 0.71 06/03/2024    BCR 21.1 06/03/2024    ANIONGAP 10.2 06/03/2024     No results found for: \"CHOL\", \"CHLPL\", \"TRIG\", \"HDL\", \"LDL\", \"LDLDIRECT\"      Current Medication List  This medication list has been reviewed with the patient and evaluated for any interactions or necessary modifications/recommendations, and updated to include all prescription medications, OTC medications, and supplements the patient is currently taking.  This list reflects what is contained in the patient's profile, which has also been marked as reviewed to communicate to other providers it is the most up to date version of the patient's current medication therapy.     Current Outpatient Medications:     Certolizumab Pegol (Cimzia) 2 X 200 MG kit injection, Inject 2 mL under the skin into the appropriate area as directed 1 (One) Time for 1 dose. Every 28 days, Disp: 1 each, Rfl: 5    ALPRAZolam (XANAX) 1 MG tablet, Take 1 tablet by mouth Daily., Disp: , Rfl: 2    aspirin 325 MG tablet, Take 1 tablet by mouth Daily., Disp: , Rfl:     atorvastatin (LIPITOR) 10 MG tablet, Take 0.5 tablets by mouth Daily., Disp: , Rfl:     buPROPion XL (WELLBUTRIN XL) 150 MG 24 hr tablet, Take 1 tablet by mouth Daily. as directed, " Disp: , Rfl: 5    Certolizumab Pegol (Cimzia) 2 X 200 MG kit injection, Inject 1 mL under the skin into the appropriate area as directed Every 14 (Fourteen) Days. Administer CIMZIA 400 mg (200 mgmg SQ week 0, 2, 4 then every 4 weeks, Disp: 2 each, Rfl: 3    folic acid (FOLVITE) 1 MG tablet, Take 1 tablet by mouth Daily., Disp: 30 tablet, Rfl: 3    methotrexate 2.5 MG tablet, Take 7 tablets by mouth 1 (One) Time Per Week. 7 on Sunday, Disp: 35 tablet, Rfl: 3    Olmesartan Medoxomil-HCTZ (BENICAR HCT PO), Take 1 tablet by mouth daily., Disp: , Rfl:     traZODone (DESYREL) 100 MG tablet, Take 0.5 tablets by mouth Every Night., Disp: , Rfl:     Medicines reviewed by Gianni Diego, PharmD on 6/4/2024 at 10:37 AM  Medicines reviewed by Gianni Diego, PharmD on 6/4/2024 at 10:37 AM    Drug Interactions  N/a    Initial Education Provided for Specialty Medication  The patient has been provided with the following education and any applicable administration techniques (i.e. self-injection) have been demonstrated for the therapies indicated. All questions and concerns have been addressed prior to the patient receiving the medication, and the patient has verbalized comprehension of the education and any materials provided. Additional patient education shall be provided and documented upon request by the patient, provider, or payer.       Cimzia (certolizumab pegol)        Medication Expectations   Why am I taking this medication? You are taking this medication for plaque psoriasis, psoriatic arthritis, ankylosing spondylitis, nonradiographic axial spondyloarthritis, or Crohn's disease.   What should I expect while on this medication? You should expect a decrease in the frequency and severity of symptoms.   How does the medication work? Certolizumab pegol is a monoclonal antibody that binds to and neutralizes tumor necrosis factor alpha (TNF-alpha).   How long will I be on this medication for? The amount of  time you will be on this medication will be determined by your doctor and your response to the medication.    How do I take this medication? Take as directed on your prescription label. This medication is a subcutaneous injection given in the fatty part of the skin on the top of the thigh or stomach area. Allow to sit at room temperature for 30 minutes prior to injection.   What are some possible side effects? Injection site reactions and hypersensitivity reactions, headache, diarrhea, signs of a common cold, stomach pain, or upset stomach.   What happens if I miss a dose? If you miss a dose, take it as soon as you remember. If it is close to the time for your next dose, skip the missed dose and go back to your normal time.              Medication Safety   What are things I should warn my doctor immediately about? Allergic reaction such has hives or trouble breathing. If you develop symptoms of infection such as a cough that does not go away, weight loss, changes in how often you urinate, changes in sweating, muscle pain or weakness, or severe dizziness.     What are things that I should be cautious of? Injection site reaction and headache. You may have more chance of getting an infection.  Wash your hands often and stay away from people with infections, colds, or flu.   What are some medications that can interact with this one? Immunosuppressants and vaccines.            Medication Storage/Handling   How should I handle this medication? Keep this medication our of reach of pets/children in original container. Store in the original container to protect from light. Do not freeze. Do not inject where the skin is tender, bruised, red, hard, or has scars or stretch marks. Rotate injection sites.   How does this medication need to be stored? Store in refrigerator and keep dry. If needed, you may store at room temperature for up to 7 days but do not return to the refrigerator if unused.    How should I dispose of this  medication? You can dispose of the syringe in a sharps container, and if this is not available you may use an empty hard plastic container such as a milk jug or tide container. Discard any unused portion or if stored outside of refrigerator > 7 days.            Resources/Support   How can I remind myself to take this medication? You can download a reminder troy on your phone or use a calandar  to help with your injection.   Is financial support available?  Yes, [employer] program can provide co-pay assisstance if you have commercial insurance or patient assistance if you have Medicare or no insurance.    Which vaccines are recommended for me? Talk to your doctor about these vaccines: Flu, Coronavirus (COVID-19), Pneumococcal (pneumonia), Tdap, Hepatitis B, Zoster (shingles).              Adherence and Self-Administration  Adherence related to the patient's specialty therapy was discussed with the patient. The Adherence segment of this outreach has been reviewed and updated.     Is there a concern with patient's ability to self administer the medication correctly and without issue?: No  Were any potential barriers to adherence identified during the initial assessment or patient education?: No  Are there any concerns regarding the patient's understanding of the importance of medication adherence?: No  Methods for Supporting Patient Adherence and/or Self-Administration: n/a    Open Medication Therapy Problems  No medication therapy recommendations to display    Goals of Therapy  Goals related to the patient's specialty therapy were discussed with the patient. The Patient Goals segment of this outreach has been reviewed and updated.   Goals Addressed Today        Specialty Pharmacy General Goal      Reduce number of flares and pain score.               Reassessment Plan & Follow-Up  1. Medication Therapy Changes: Cimzia 400mg once every 28 days  2. Related Plans, Therapy Recommendations, or Therapy Problems to Be  Addressed: Patient currently on therapy and does not have any questions or concerns about medication or administration.  Patient has been without medication due to transition of ACL to [place].  Advised patient to call clinic with any questions or concerns.   3. Pharmacist to perform regular assessments no more than (6) months from the previous assessment.  4. Care Coordinator to set up future refill outreaches, coordinate prescription delivery, and escalate clinical questions to pharmacist.  5. Welcome information and patient satisfaction survey to be sent by specialty pharmacy team with patient's initial fill.    Attestation  Therapeutic appropriateness: Appropriate   I attest the patient was actively involved in and has agreed to the above plan of care. If the prescribed therapy is at any point deemed not appropriate based on the current or future assessments, a consultation will be initiated with the patient's specialty care provider to determine the best course of action. The revised plan of therapy will be documented along with any required assessments and/or additional patient education provided.     Gianni Diego, PharmD  Clinical Specialty Pharmacist, Rheumatology  6/4/2024  10:58 EDT

## 2024-06-25 ENCOUNTER — SPECIALTY PHARMACY (OUTPATIENT)
Dept: GENERAL RADIOLOGY | Facility: HOSPITAL | Age: 64
End: 2024-06-25
Payer: COMMERCIAL

## 2024-06-25 ENCOUNTER — SPECIALTY PHARMACY (OUTPATIENT)
Age: 64
End: 2024-06-25
Payer: COMMERCIAL

## 2024-06-25 RX ORDER — CERTOLIZUMAB PEGOL 200 MG/ML
400 INJECTION, SOLUTION SUBCUTANEOUS
Qty: 2 ML | Refills: 3 | Status: SHIPPED | OUTPATIENT
Start: 2024-06-25

## 2024-07-24 ENCOUNTER — SPECIALTY PHARMACY (OUTPATIENT)
Age: 64
End: 2024-07-24
Payer: COMMERCIAL

## 2024-08-05 RX ORDER — CELECOXIB 200 MG/1
200 CAPSULE ORAL DAILY PRN
Qty: 30 CAPSULE | Refills: 3 | Status: SHIPPED | OUTPATIENT
Start: 2024-08-05

## 2024-08-15 RX ORDER — CERTOLIZUMAB PEGOL 200 MG/ML
400 INJECTION, SOLUTION SUBCUTANEOUS
Qty: 2 ML | Refills: 3 | Status: SHIPPED | OUTPATIENT
Start: 2024-08-15

## 2024-08-15 NOTE — TELEPHONE ENCOUNTER
Patient called stating that she hasn't received her Cimzia yet. She states she got it filled through [facility] one time but she would prefer to receive it through [facility]. New rx sent to [facility].

## 2024-08-17 ENCOUNTER — APPOINTMENT (OUTPATIENT)
Dept: GENERAL RADIOLOGY | Facility: HOSPITAL | Age: 64
End: 2024-08-17
Payer: COMMERCIAL

## 2024-08-17 ENCOUNTER — HOSPITAL ENCOUNTER (EMERGENCY)
Facility: HOSPITAL | Age: 64
Discharge: HOME | End: 2024-08-17
Attending: EMERGENCY MEDICINE
Payer: COMMERCIAL

## 2024-08-17 VITALS
OXYGEN SATURATION: 99 % | DIASTOLIC BLOOD PRESSURE: 63 MMHG | HEART RATE: 71 BPM | SYSTOLIC BLOOD PRESSURE: 152 MMHG | RESPIRATION RATE: 14 BRPM | HEIGHT: 63 IN | TEMPERATURE: 98.1 F | BODY MASS INDEX: 29.23 KG/M2 | WEIGHT: 165 LBS

## 2024-08-17 DIAGNOSIS — S62.101A CLOSED FRACTURE OF RIGHT WRIST, INITIAL ENCOUNTER: Primary | ICD-10-CM

## 2024-08-17 PROCEDURE — 73110 X-RAY EXAM OF WRIST: CPT | Performed by: RADIOLOGY

## 2024-08-17 PROCEDURE — 99283 EMERGENCY DEPT VISIT LOW MDM: CPT

## 2024-08-17 PROCEDURE — 73090 X-RAY EXAM OF FOREARM: CPT | Performed by: RADIOLOGY

## 2024-08-17 PROCEDURE — 73110 X-RAY EXAM OF WRIST: CPT

## 2024-08-17 PROCEDURE — 73090 X-RAY EXAM OF FOREARM: CPT

## 2024-08-17 RX ORDER — HYDROCODONE BITARTRATE AND ACETAMINOPHEN 5; 325 MG/1; MG/1
1 TABLET ORAL ONCE
Status: COMPLETED | OUTPATIENT
Start: 2024-08-17 | End: 2024-08-17

## 2024-08-17 RX ORDER — HYDROCODONE BITARTRATE AND ACETAMINOPHEN 5; 325 MG/1; MG/1
1 TABLET ORAL EVERY 6 HOURS PRN
Qty: 10 TABLET | Refills: 0 | Status: SHIPPED | OUTPATIENT
Start: 2024-08-17

## 2024-08-17 RX ADMIN — HYDROCODONE BITARTRATE AND ACETAMINOPHEN 1 TABLET: 5; 325 TABLET ORAL at 11:33

## 2024-08-17 NOTE — ED PROVIDER NOTES
Subjective   History of Present Illness  63-year-old female presents secondary to right wrist injury.  Patient states that she fell and landed on her wrist on concrete.  She denies any head injury, neck injury, back injury chest/torso injury.  She voices no other complaints this time.  This occurred prior to arrival.  She has a past medical history of rheumatoid arthritis, arthritis, kidney stones, hypertension, hyperlipidemia, acid reflux.  She presents by private vehicle.      Review of Systems   Constitutional: Negative.  Negative for fever.   HENT: Negative.     Respiratory: Negative.     Cardiovascular: Negative.  Negative for chest pain.   Gastrointestinal: Negative.  Negative for abdominal pain.   Endocrine: Negative.    Genitourinary: Negative.  Negative for dysuria.   Skin: Negative.    Neurological: Negative.    Psychiatric/Behavioral: Negative.     All other systems reviewed and are negative.      Past Medical History:   Diagnosis Date    Carpal tunnel syndrome of right wrist     GERD (gastroesophageal reflux disease)     High cholesterol     Hypertension     IBS (irritable bowel syndrome)     Kidney stones     Primary osteoarthritis of both knees     Rheumatoid arthritis     SOB (shortness of breath)        Allergies   Allergen Reactions    Adhesive Tape Hives     Pt states that she had this when she was pregnant years ago     Latex Hives     Pt states that she had this when she was pregnant years ago          Past Surgical History:   Procedure Laterality Date    CATARACT EXTRACTION       SECTION      x2    CYSTECTOMY      DILATATION AND CURETTAGE      FRACTURE SURGERY      JOINT REPLACEMENT Right     TKA 2016    KIDNEY STONE SURGERY      removal       Family History   Problem Relation Age of Onset    Depression Mother     Hypertension Mother     Cancer Mother     Diabetes Mother     Breast cancer Mother     Cervical cancer Mother     Diabetes Father     Hypertension Father     Heart attack  Father     Heart disease Father     Diabetes Brother     Hypertension Brother        Social History     Socioeconomic History    Marital status:    Tobacco Use    Smoking status: Never     Passive exposure: Never    Smokeless tobacco: Never   Vaping Use    Vaping status: Never Used   Substance and Sexual Activity    Alcohol use: Yes     Comment: occasional    Drug use: No    Sexual activity: Defer           Objective   Physical Exam  Vitals and nursing note reviewed.   Constitutional:       General: She is not in acute distress.     Appearance: She is well-developed. She is not diaphoretic.   HENT:      Head: Normocephalic and atraumatic.      Right Ear: External ear normal.      Left Ear: External ear normal.      Nose: Nose normal.   Eyes:      Conjunctiva/sclera: Conjunctivae normal.      Pupils: Pupils are equal, round, and reactive to light.   Neck:      Vascular: No JVD.      Trachea: No tracheal deviation.   Cardiovascular:      Rate and Rhythm: Normal rate and regular rhythm.      Heart sounds: Normal heart sounds. No murmur heard.  Pulmonary:      Effort: Pulmonary effort is normal. No respiratory distress.      Breath sounds: Normal breath sounds. No wheezing.   Abdominal:      General: Bowel sounds are normal.      Palpations: Abdomen is soft.      Tenderness: There is no abdominal tenderness.   Musculoskeletal:         General: No deformity. Normal range of motion.      Cervical back: Normal range of motion and neck supple.      Comments: Swelling with tenderness right wrist.  Neurovascular intact distally.   Skin:     General: Skin is warm and dry.      Coloration: Skin is not pale.      Findings: No erythema or rash.   Neurological:      Mental Status: She is alert and oriented to person, place, and time.      Cranial Nerves: No cranial nerve deficit.   Psychiatric:         Behavior: Behavior normal.         Thought Content: Thought content normal.         Procedures           ED Course                                              Medical Decision Making  63-year-old female presents secondary to right wrist injury.  Patient states that she fell and landed on her wrist on concrete.  She denies any head injury, neck injury, back injury chest/torso injury.  She voices no other complaints this time.  This occurred prior to arrival.  She has a past medical history of rheumatoid arthritis, arthritis, kidney stones, hypertension, hyperlipidemia, acid reflux.  She presents by private vehicle.    Problems Addressed:  Closed fracture of right wrist, initial encounter: complicated acute illness or injury    Amount and/or Complexity of Data Reviewed  Radiology: ordered. Decision-making details documented in ED Course.  Discussion of management or test interpretation with external provider(s): Dr. Soriano who recommends splint and follow-up in the office.  Patient was going to [facility] tomorrow and will be following up after she returns on Friday.    Risk  Prescription drug management.  Risk Details: Patient was counseled better diagnostic workup.  She was counseled out the need for follow-up.  Voices understanding.  She was counseled at the signs and symptoms worsening with appropriate follow-up.  She was counseled about the need for elevation and ice along with rest and splinting.  She voiced understanding.        Final diagnoses:   Closed fracture of right wrist, initial encounter       ED Disposition  ED Disposition       ED Disposition   Discharge    Condition   Stable    Comment   --               Len Soriano, DO  [address] [id number] [phone]    Schedule an appointment as soon as possible for a visit            Medication List        New Prescriptions      HYDROcodone-acetaminophen 5-325 MG per tablet  Commonly known as: NORCO  Take 1 tablet by mouth Every 6 (Six) Hours As Needed for Severe Pain.               Where to Get Your Medications        These medications were sent to  [facility]/pharmacy #[id number] - [place], [place] - [address] - [phone]  - [phone] [address] [address]      Phone: [phone]   HYDROcodone-acetaminophen 5-325 MG per tablet            Mike Carmichael PA  08/17/24 1471

## 2024-10-22 PROBLEM — R53.82 CHRONIC FATIGUE: Status: ACTIVE | Noted: 2024-10-22

## 2024-10-22 PROBLEM — M85.89 OSTEOPENIA OF MULTIPLE SITES: Status: ACTIVE | Noted: 2024-10-22

## 2024-10-22 PROBLEM — Z79.899 HIGH RISK MEDICATION USE: Status: ACTIVE | Noted: 2024-10-22

## 2024-10-22 PROBLEM — Z79.899 IMMUNOSUPPRESSION DUE TO DRUG THERAPY: Status: ACTIVE | Noted: 2024-10-22

## 2024-10-22 PROBLEM — D84.821 IMMUNOSUPPRESSION DUE TO DRUG THERAPY: Status: ACTIVE | Noted: 2024-10-22

## 2024-10-22 PROBLEM — R76.8 POSITIVE ANA (ANTINUCLEAR ANTIBODY): Status: ACTIVE | Noted: 2024-10-22

## 2024-10-22 NOTE — PROGRESS NOTES
Office Follow Up      Date: 10/24/2024   Patient Name: Ashley Hankins  MRN: [id number]  YOB: 1960    Referring Physician: No ref. provider found     Chief Complaint: Rheumatoid arthritis        History of Present Illness: Ashley Hankins is a 64 y.o. female who is here today for follow up on rheumatoid arthritis.  In interim fell and fractured R wrist. Did not need surgery. It has healed well.   RA is doing well on injectable Cimzia and MTX.   She is tolerating Cimzia. No joint swelling.  No SE's or infections.  No problems with MTX.   Stopped Celebrex without worsening.    diagnosed with pancreatic cancer. Had surgery 7/20/23.  He has liver mets and is on Keytruda. He is doing poorly.   Pain scale: 3/10. EMS is 5 minutes.   The problem has not changed. The primary symptoms reported include: pain and stiffness. The following symptoms are not reported:  swelling. The patient assesses the interval disease activity as: stable. The patient's assessment of treatment is: helping greatly. The locations affected since last visit are bilateral knee. Associated symptoms include AM stiffness (5 Minutes). Pertinent negatives include fever, infection, fatigue, eye symptoms, change in vision, sicca complaints, mouth sores/lesions, skin lesion(s), chest pain, changing cough, edema, joint swelling and abdominal pain.     Subjective   Review of Systems: Review of Systems   Constitutional:  Negative for chills, fatigue, fever and unexpected weight loss.   HENT:  Negative for dental problem, mouth sores, sinus pressure and swollen glands.    Eyes:  Negative for photophobia, pain and redness.   Respiratory:  Negative for apnea, cough, chest tightness and shortness of breath.    Cardiovascular:  Negative for chest pain and leg swelling.   Gastrointestinal:  Negative for abdominal pain, diarrhea, nausea and GERD.   Genitourinary:  Negative for dysuria and hematuria.   Musculoskeletal:   Positive for back pain.        Joint pain  Morning stiffness   Skin:  Negative for dry skin, rash, skin lesions and bruise.   Neurological:  Negative for dizziness, seizures, syncope, weakness, headache and memory problem.   Hematological:  Negative for adenopathy. Does not bruise/bleed easily.   Psychiatric/Behavioral:  Positive for sleep disturbance. Negative for suicidal ideas, depressed mood and stress. The patient is not nervous/anxious.    All other systems reviewed and are negative.       Medications:   Current Outpatient Medications:     ALPRAZolam (XANAX) 1 MG tablet, Take 1 tablet by mouth Daily., Disp: , Rfl: 2    atorvastatin (LIPITOR) 10 MG tablet, Take 0.5 tablets by mouth Daily., Disp: , Rfl:     buPROPion XL (WELLBUTRIN XL) 150 MG 24 hr tablet, Take 1 tablet by mouth Daily. as directed, Disp: , Rfl: 5    Certolizumab Pegol (Cimzia, 2 Syringe,) 200 MG/ML Prefilled Syringe Kit injection, Inject 2 mL under the skin into the appropriate area as directed Every 28 (Twenty-Eight) Days., Disp: 2 mL, Rfl: 3    folic acid (FOLVITE) 1 MG tablet, Take 1 tablet by mouth Daily., Disp: 30 tablet, Rfl: 3    methotrexate 2.5 MG tablet, Take 7 tablets by mouth 1 (One) Time Per Week. 7 on Sunday, Disp: 35 tablet, Rfl: 3    Olmesartan Medoxomil-HCTZ (BENICAR HCT PO), Take 1 tablet by mouth daily., Disp: , Rfl:     ondansetron (ZOFRAN) 4 MG tablet, Take 1 tablet by mouth Every 6 (Six) Hours., Disp: , Rfl:     Progesterone (PROMETRIUM) 100 MG capsule, Take 1 capsule by mouth Every Night., Disp: , Rfl:     traZODone (DESYREL) 100 MG tablet, Take 0.5 tablets by mouth Every Night., Disp: , Rfl:     vitamin D (ERGOCALCIFEROL) 1.25 MG (04414 UT) capsule capsule, Take 1 capsule by mouth 1 (One) Time Per Week., Disp: , Rfl:     aspirin 325 MG tablet, Take 1 tablet by mouth Daily. (Patient not taking: Reported on 10/24/2024), Disp: , Rfl:     FLUoxetine (PROzac) 20 MG capsule, Take 1 capsule by mouth Daily. (Patient not taking:  "Reported on 10/24/2024), Disp: , Rfl:     HYDROcodone-acetaminophen (NORCO) 5-325 MG per tablet, Take 1 tablet by mouth Every 6 (Six) Hours As Needed for Severe Pain. (Patient not taking: Reported on 10/24/2024), Disp: 10 tablet, Rfl: 0    predniSONE (DELTASONE) 5 MG tablet, Take 4 tablets by mouth Every Morning for 3 days, THEN 3 tablets Every Morning for 3 days, THEN 2 tablets Every Morning for 3 days, THEN 1 tablet Every Morning for 3 days. Take 4 tablets every morning for 3 days, 3 tablets every morning for 3 days, 2 tablets every morning for 3 day, 1 tablet every morning for 3 days., Disp: 30 tablet, Rfl: 0    Allergies:   Allergies   Allergen Reactions    Adhesive Tape Hives     Pt states that she had this when she was pregnant years ago     Latex Hives     Pt states that she had this when she was pregnant years ago          I have reviewed and updated the patient's chief complaint, history of present illness, review of systems, past medical history, surgical history, family history, social history, medications and allergy list as appropriate.     Objective    Vital Signs:   Vitals:    10/24/24 0833   BP: 122/70   BP Location: Left arm   Patient Position: Sitting   Cuff Size: Adult   Pulse: 82   Temp: 97.9 °F (36.6 °C)   Weight: 77.1 kg (170 lb)   Height: 160 cm (63\")   PainSc:   3   PainLoc: Generalized       Body mass index is 30.11 kg/m².    Physical Exam:  GENERAL:  The patient is well-developed and well nourished.  Cooperative and oriented x3.  Affect is normal.  Hydration appears normal.  HEENT:  Normocephalic and atraumatic.  No notable alopecia.  No facial rash.  Lids and conjunctiva are normal.  Pupils are equal and sclerae are clear.  I see no oral or nasal ulcers.  Lips, teeth, and gums are within normal limits.  Oropharynx is clear.  NECK:  Neck is supple without adenopathy, masses, or thyromegaly.  CARDIOVASCULAR:  Normal S1, S2.  No murmurs are heard.  LUNGS:  Clear to auscultation and " percussion.  ABDOMEN: Soft and nontender without HSM.   EXTREMITIES:  No edema.  No cyanosis or clubbing.   SKIN:  Palpation and inspection are normal.  She has no psoriatic lesions, nodules, nail pits, or rashes.  NEUROLOGIC:  Gait is normal.    MUSCULOSKELETAL:  Complete joint exam was performed.  There was full range of motion of the shoulders, elbows, wrists, and hands without notable deformities, soft tissue swelling, synovitis, or atrophy except as noted. Minimal degenerative changes are seen in the fingers. R wrist slightly angulated without swelling.  Hips have good flexion and internal and external rotation.  Knees have no palpable effusions.  There is a well-healed right anterior knee scar from total joint replacement.  Left knee has slight limitation of flexion but full extension.  She has mild pain at full flexion.  Ankles have no soft tissue swelling or synovitis or major deformities.   BACK:  Straight without scoliosis.  Joint Exam 10/24/2024     The following joints were examined and normal:   Left Glenohumeral, Right Glenohumeral, Left Elbow, Right Elbow, Left Wrist, Right Wrist, Left MCP 1, Right MCP 1, Left MCP 2, Right MCP 2, Left MCP 3, Right MCP 3, Left MCP 4, Right MCP 4, Left MCP 5, Right MCP 5, Left IP (thumb), Right IP (thumb), Left PIP 2 (finger), Right PIP 2 (finger), Left PIP 3 (finger), Right PIP 3 (finger), Left PIP 4 (finger), Right PIP 4 (finger), Left PIP 5 (finger), Right PIP 5 (finger), Left Knee, Right Knee       Patient Global: 40 / 100  Provider Global: 0 / 100      Assessment / Plan      Assessment & Plan  Rheumatoid arthritis involving multiple sites with positive rheumatoid factor  Onset 2009. RF, CCP, and SANTHOSH +. Methotrexate started by primary care provider 2016.  Cimzia started 9/2022.  .  RA continues to do well with Cimzia.    She did fracture her wrist in the interim. It has healed well but there is some angulation.   Tender joint count is 0 , swollen joint count is 0 ,  physician global is 0 , visual analog pain scale is 3 , pt global is 4.  CDAI is 4-low disease activity.  Continue MTX and q 6 week labs  Continue Cimzia.   F/u in 3 months  I will give a burst and taper of prednisone to take to [place] with her.   Primary osteoarthritis of both knees  Status post right total knee replacement 2017.  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  High risk medication use  Methotrexate.  She understands the need for every 6 to 8-week labs.  Positive SANTHSOH (antinuclear antibody)  No evidence of associated disease.   Osteopenia of multiple sites  Followed by her PCP. She is on alendronate.   Immunosuppression due to drug therapy  Cimzia.  No infections.  Chronic fatigue      Orders Placed This Encounter   Procedures    CBC Auto Differential    Comprehensive Metabolic Panel    C-reactive Protein    Sedimentation Rate       New Medications Ordered This Visit   Medications    predniSONE (DELTASONE) 5 MG tablet     Sig: Take 4 tablets by mouth Every Morning for 3 days, THEN 3 tablets Every Morning for 3 days, THEN 2 tablets Every Morning for 3 days, THEN 1 tablet Every Morning for 3 days. Take 4 tablets every morning for 3 days, 3 tablets every morning for 3 days, 2 tablets every morning for 3 day, 1 tablet every morning for 3 days.     Dispense:  30 tablet     Refill:  0    Certolizumab Pegol (Cimzia, 2 Syringe,) 200 MG/ML Prefilled Syringe Kit injection     Sig: Inject 2 mL under the skin into the appropriate area as directed Every 28 (Twenty-Eight) Days.     Dispense:  2 mL     Refill:  3    folic acid (FOLVITE) 1 MG tablet     Sig: Take 1 tablet by mouth Daily.     Dispense:  30 tablet     Refill:  3    methotrexate 2.5 MG tablet     Sig: Take 7 tablets by mouth 1 (One) Time Per Week. 7 on Sunday     Dispense:  35 tablet     Refill:  3            Follow Up:   Return in about 3 months (around 1/24/2025).        Arturo Luna MD  [facility]  [place]

## 2024-10-22 NOTE — ASSESSMENT & PLAN NOTE
Onset 2009. RF, CCP, and SANTHOSH +. Methotrexate started by primary care provider 2016.  Cimzia started 9/2022.  .  RA continues to do well with Cimzia.    She did fracture her wrist in the interim. It has healed well but there is some angulation.   Tender joint count is 0 , swollen joint count is 0 , physician global is 0 , visual analog pain scale is 3 , pt global is 4.  CDAI is 4-low disease activity.  Continue MTX and q 6 week labs  Continue Cimzia.   F/u in 3 months  I will give a burst and taper of prednisone to take to [place] with her.

## 2024-10-22 NOTE — ASSESSMENT & PLAN NOTE
Status post right total knee replacement 2017.  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

## 2024-10-24 ENCOUNTER — OFFICE VISIT (OUTPATIENT)
Age: 64
End: 2024-10-24
Payer: COMMERCIAL

## 2024-10-24 ENCOUNTER — LAB (OUTPATIENT)
Facility: HOSPITAL | Age: 64
End: 2024-10-24
Payer: COMMERCIAL

## 2024-10-24 VITALS
HEIGHT: 63 IN | HEART RATE: 82 BPM | BODY MASS INDEX: 30.12 KG/M2 | WEIGHT: 170 LBS | TEMPERATURE: 97.9 F | SYSTOLIC BLOOD PRESSURE: 122 MMHG | DIASTOLIC BLOOD PRESSURE: 70 MMHG

## 2024-10-24 DIAGNOSIS — D84.821 IMMUNOSUPPRESSION DUE TO DRUG THERAPY: ICD-10-CM

## 2024-10-24 DIAGNOSIS — Z79.899 IMMUNOSUPPRESSION DUE TO DRUG THERAPY: ICD-10-CM

## 2024-10-24 DIAGNOSIS — Z79.899 HIGH RISK MEDICATION USE: ICD-10-CM

## 2024-10-24 DIAGNOSIS — M85.89 OSTEOPENIA OF MULTIPLE SITES: ICD-10-CM

## 2024-10-24 DIAGNOSIS — M17.0 PRIMARY OSTEOARTHRITIS OF BOTH KNEES: ICD-10-CM

## 2024-10-24 DIAGNOSIS — R76.8 POSITIVE ANA (ANTINUCLEAR ANTIBODY): ICD-10-CM

## 2024-10-24 DIAGNOSIS — M05.79 RHEUMATOID ARTHRITIS INVOLVING MULTIPLE SITES WITH POSITIVE RHEUMATOID FACTOR: ICD-10-CM

## 2024-10-24 DIAGNOSIS — R53.82 CHRONIC FATIGUE: ICD-10-CM

## 2024-10-24 DIAGNOSIS — M05.79 RHEUMATOID ARTHRITIS INVOLVING MULTIPLE SITES WITH POSITIVE RHEUMATOID FACTOR: Primary | ICD-10-CM

## 2024-10-24 LAB
ALBUMIN SERPL-MCNC: 4.1 G/DL (ref 3.5–5.2)
ALBUMIN/GLOB SERPL: 1.3 G/DL
ALP SERPL-CCNC: 59 U/L (ref 39–117)
ALT SERPL W P-5'-P-CCNC: 16 U/L (ref 1–33)
ANION GAP SERPL CALCULATED.3IONS-SCNC: 8.1 MMOL/L (ref 5–15)
AST SERPL-CCNC: 18 U/L (ref 1–32)
BASOPHILS # BLD AUTO: 0.03 10*3/MM3 (ref 0–0.2)
BASOPHILS NFR BLD AUTO: 0.3 % (ref 0–1.5)
BILIRUB SERPL-MCNC: 0.8 MG/DL (ref 0–1.2)
BUN SERPL-MCNC: 20 MG/DL (ref 8–23)
BUN/CREAT SERPL: 21.7 (ref 7–25)
CALCIUM SPEC-SCNC: 9.5 MG/DL (ref 8.6–10.5)
CHLORIDE SERPL-SCNC: 99 MMOL/L (ref 98–107)
CO2 SERPL-SCNC: 29.9 MMOL/L (ref 22–29)
CREAT SERPL-MCNC: 0.92 MG/DL (ref 0.57–1)
CRP SERPL-MCNC: <0.3 MG/DL (ref 0–0.5)
DEPRECATED RDW RBC AUTO: 46.5 FL (ref 37–54)
EGFRCR SERPLBLD CKD-EPI 2021: 69.7 ML/MIN/1.73
EOSINOPHIL # BLD AUTO: 0.09 10*3/MM3 (ref 0–0.4)
EOSINOPHIL NFR BLD AUTO: 1 % (ref 0.3–6.2)
ERYTHROCYTE [DISTWIDTH] IN BLOOD BY AUTOMATED COUNT: 13.3 % (ref 12.3–15.4)
ERYTHROCYTE [SEDIMENTATION RATE] IN BLOOD: 8 MM/HR (ref 0–30)
GLOBULIN UR ELPH-MCNC: 3.1 GM/DL
GLUCOSE SERPL-MCNC: 82 MG/DL (ref 65–99)
HCT VFR BLD AUTO: 45.1 % (ref 34–46.6)
HGB BLD-MCNC: 14.8 G/DL (ref 12–15.9)
IMM GRANULOCYTES # BLD AUTO: 0.02 10*3/MM3 (ref 0–0.05)
IMM GRANULOCYTES NFR BLD AUTO: 0.2 % (ref 0–0.5)
LYMPHOCYTES # BLD AUTO: 3.09 10*3/MM3 (ref 0.7–3.1)
LYMPHOCYTES NFR BLD AUTO: 35.8 % (ref 19.6–45.3)
MCH RBC QN AUTO: 31.4 PG (ref 26.6–33)
MCHC RBC AUTO-ENTMCNC: 32.8 G/DL (ref 31.5–35.7)
MCV RBC AUTO: 95.6 FL (ref 79–97)
MONOCYTES # BLD AUTO: 0.74 10*3/MM3 (ref 0.1–0.9)
MONOCYTES NFR BLD AUTO: 8.6 % (ref 5–12)
NEUTROPHILS NFR BLD AUTO: 4.65 10*3/MM3 (ref 1.7–7)
NEUTROPHILS NFR BLD AUTO: 54.1 % (ref 42.7–76)
NRBC BLD AUTO-RTO: 0 /100 WBC (ref 0–0.2)
PLATELET # BLD AUTO: 330 10*3/MM3 (ref 140–450)
PMV BLD AUTO: 9.4 FL (ref 6–12)
POTASSIUM SERPL-SCNC: 3.8 MMOL/L (ref 3.5–5.2)
PROT SERPL-MCNC: 7.2 G/DL (ref 6–8.5)
RBC # BLD AUTO: 4.72 10*6/MM3 (ref 3.77–5.28)
SODIUM SERPL-SCNC: 137 MMOL/L (ref 136–145)
WBC NRBC COR # BLD AUTO: 8.62 10*3/MM3 (ref 3.4–10.8)

## 2024-10-24 PROCEDURE — 85025 COMPLETE CBC W/AUTO DIFF WBC: CPT

## 2024-10-24 PROCEDURE — 36415 COLL VENOUS BLD VENIPUNCTURE: CPT

## 2024-10-24 PROCEDURE — 80053 COMPREHEN METABOLIC PANEL: CPT

## 2024-10-24 PROCEDURE — 85652 RBC SED RATE AUTOMATED: CPT

## 2024-10-24 PROCEDURE — 86140 C-REACTIVE PROTEIN: CPT

## 2024-10-24 RX ORDER — METHOTREXATE 2.5 MG/1
17.5 TABLET ORAL WEEKLY
Qty: 35 TABLET | Refills: 3 | Status: SHIPPED | OUTPATIENT
Start: 2024-10-24

## 2024-10-24 RX ORDER — PROGESTERONE 100 MG/1
100 CAPSULE ORAL NIGHTLY
COMMUNITY
Start: 2024-10-07

## 2024-10-24 RX ORDER — PREDNISONE 5 MG/1
TABLET ORAL
Qty: 30 TABLET | Refills: 0 | Status: SHIPPED | OUTPATIENT
Start: 2024-10-24 | End: 2024-11-05

## 2024-10-24 RX ORDER — ERGOCALCIFEROL 1.25 MG/1
1 CAPSULE, LIQUID FILLED ORAL WEEKLY
COMMUNITY
Start: 2024-10-02

## 2024-10-24 RX ORDER — FOLIC ACID 1 MG/1
1 TABLET ORAL DAILY
Qty: 30 TABLET | Refills: 3 | Status: SHIPPED | OUTPATIENT
Start: 2024-10-24

## 2024-10-24 RX ORDER — CERTOLIZUMAB PEGOL 200 MG/ML
400 INJECTION, SOLUTION SUBCUTANEOUS
Qty: 2 ML | Refills: 3 | Status: SHIPPED | OUTPATIENT
Start: 2024-10-24

## 2024-10-24 RX ORDER — ONDANSETRON 4 MG/1
1 TABLET, FILM COATED ORAL EVERY 6 HOURS
COMMUNITY
Start: 2024-09-08

## 2024-10-25 ENCOUNTER — TELEPHONE (OUTPATIENT)
Age: 64
End: 2024-10-25
Payer: COMMERCIAL

## 2024-10-25 NOTE — TELEPHONE ENCOUNTER
Prior authorization initiated by [facility].  Update will be provided when a determination has been received.     Medication: Cimzia  PA Submission Method: CMM  Case Number/CMM Key: [id number]

## 2024-11-04 ENCOUNTER — TELEPHONE (OUTPATIENT)
Age: 64
End: 2024-11-04
Payer: COMMERCIAL

## 2024-11-26 RX ORDER — CERTOLIZUMAB PEGOL 200 MG/ML
INJECTION, SOLUTION SUBCUTANEOUS
Qty: 1 EACH | Refills: 2 | Status: SHIPPED | OUTPATIENT
Start: 2024-11-26

## 2025-01-29 ENCOUNTER — TELEPHONE (OUTPATIENT)
Age: 65
End: 2025-01-29
Payer: COMMERCIAL

## 2025-01-29 NOTE — TELEPHONE ENCOUNTER
Pt will need to schedule appt. Pt also has a standing lab order that will need to be completed. LVM for pt to return our call regarding refill request for MTX.

## 2025-02-18 RX ORDER — METHOTREXATE 2.5 MG/1
TABLET ORAL
Qty: 35 TABLET | Refills: 3 | Status: SHIPPED | OUTPATIENT
Start: 2025-02-18

## 2025-02-19 RX ORDER — CERTOLIZUMAB PEGOL 200 MG/ML
INJECTION, SOLUTION SUBCUTANEOUS
Qty: 2 ML | Refills: 5 | Status: SHIPPED | OUTPATIENT
Start: 2025-02-19

## 2025-02-19 NOTE — TELEPHONE ENCOUNTER
Specialty Pharmacy Patient Management Program  Per Protocol Prescription Order/Refill     Patient currently fills medications at [facility] and is not enrolled in an Rheumatology Patient Management Program.     Requested Prescriptions     Signed Prescriptions Disp Refills    Certolizumab Pegol (Cimzia, 2 Syringe,) 200 MG/ML Prefilled Syringe Kit injection 2 mL 5     Sig: INJECT 2 SYRINGES UNDER THE SKIN EVERY 4 WEEKS     Authorizing Provider: FRANCES MCKENNA     Ordering User: CONCHITA DE ANDA     Prescription orders above were sent to the pharmacy per Collaborative Care Agreement Protocol.

## 2025-03-10 RX ORDER — CELECOXIB 200 MG/1
200 CAPSULE ORAL DAILY PRN
Qty: 30 CAPSULE | Refills: 3 | OUTPATIENT
Start: 2025-03-10

## 2025-04-01 ENCOUNTER — TELEPHONE (OUTPATIENT)
Age: 65
End: 2025-04-01
Payer: COMMERCIAL

## 2025-05-20 ENCOUNTER — TELEPHONE (OUTPATIENT)
Age: 65
End: 2025-05-20
Payer: COMMERCIAL

## 2025-05-23 ENCOUNTER — TELEPHONE (OUTPATIENT)
Age: 65
End: 2025-05-23
Payer: COMMERCIAL

## 2025-05-27 NOTE — ASSESSMENT & PLAN NOTE
Methotrexate.  Last labs 10/2024  Re-educated on the need for labs Q6-8 weeks to continue to monitor labs such as liver enzymes

## 2025-05-27 NOTE — ASSESSMENT & PLAN NOTE
Status post right total knee replacement 2017.  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  Reported Left knee pain today - follows with ortho next week

## 2025-05-27 NOTE — PROGRESS NOTES
Office Follow Up      Date: 06/04/2025   Patient Name: Ashley Hankins  MRN: [id number]  YOB: 1960    Referring Physician: No ref. provider found     Chief Complaint:   Chief Complaint   Patient presents with    Rheumatoid Arthritis       History of Present Illness:   Ashley Hankins is a 64 y.o. female who is here today for follow up for rheumatoid arthritis.    She has fractured her wrist for a second time. No surgery was needed.   RA is stable on injectable Cimzia and MTX. She is tolerating Cimzia. No joint swelling.  No SE's or infections. No problems with MTX.     Her  passed from Pancreatic Cancer at the beginning of the year. She has since started taking Wellbutrin, trazodone, and Xanax     She has been working a lot helping with the [employer]. She reports that she has noticed occasional pain to the lateral side of her left knee. She has an appointment scheduled with orthopedics next week.     Pain scale: 3/10. EMS is 10 minutes.     Subjective     Review of Systems positive for nervous/anxious, sleep disturbance, stress, dry eyes      Current Outpatient Medications:     ALPRAZolam (XANAX) 1 MG tablet, Take 1 tablet by mouth Daily., Disp: , Rfl: 2    atorvastatin (LIPITOR) 10 MG tablet, Take 0.5 tablets by mouth Daily., Disp: , Rfl:     buPROPion XL (WELLBUTRIN XL) 150 MG 24 hr tablet, Take 1 tablet by mouth Daily. as directed, Disp: , Rfl: 5    celecoxib (CeleBREX) 200 MG capsule, Take 1 capsule by mouth Daily., Disp: 30 capsule, Rfl: 5    Certolizumab Pegol (Cimzia, 2 Syringe,) 200 MG/ML Prefilled Syringe Kit injection, INJECT 2 SYRINGES UNDER THE SKIN EVERY 4 WEEKS, Disp: 2 mL, Rfl: 5    folic acid (FOLVITE) 1 MG tablet, Take 1 tablet by mouth Daily., Disp: 30 tablet, Rfl: 3    methotrexate 2.5 MG tablet, Take 5 tablets by mouth 1 (One) Time Per Week. Take 5 tablets daily. If pain re increased/ swelling is noted you can re-increase to  "7 pills/day., Disp: 30 tablet, Rfl: 3    olmesartan-hydrochlorothiazide (BENICAR HCT) 20-12.5 MG per tablet, Take 1 tablet by mouth Daily., Disp: , Rfl:     Progesterone (PROMETRIUM) 100 MG capsule, Take 1 capsule by mouth Every Night., Disp: , Rfl:     traZODone (DESYREL) 100 MG tablet, Take 0.5 tablets by mouth Every Night., Disp: , Rfl:     vitamin D (ERGOCALCIFEROL) 1.25 MG (96544 UT) capsule capsule, Take 1 capsule by mouth 1 (One) Time Per Week., Disp: , Rfl:     Allergies   Allergen Reactions    Adhesive Tape Hives     Pt states that she had this when she was pregnant years ago     Latex Hives     Pt states that she had this when she was pregnant years ago          I have reviewed and updated the patient's chief complaint, history of present illness, review of systems, past medical history, surgical history, family history, social history, medications and allergy list as appropriate.     Objective      Vitals:    06/04/25 0940   BP: 134/84   BP Location: Left arm   Patient Position: Sitting   Cuff Size: Adult   Pulse: 85   Temp: 97.2 °F (36.2 °C)   Weight: 72.7 kg (160 lb 4.8 oz)   Height: 160 cm (63\")   PainSc: 3      Body mass index is 28.4 kg/m².      Physical Exam  Constitutional:       Appearance: Normal appearance.   HENT:      Head: Normocephalic.   Eyes:      Pupils: Pupils are equal, round, and reactive to light.   Cardiovascular:      Rate and Rhythm: Normal rate.      Pulses: Normal pulses.   Pulmonary:      Effort: Pulmonary effort is normal.   Musculoskeletal:      Cervical back: Normal range of motion and neck supple.      Comments: Scar Right knee   Bilateral knee crepitus   Ankle swelling right ankle - non tender, no pain   No synovitis    Skin:     General: Skin is warm.   Neurological:      General: No focal deficit present.      Mental Status: She is alert and oriented to person, place, and time.   Psychiatric:         Behavior: Behavior normal.         Thought Content: Thought content " normal.          CMP          10/24/2024    09:04   CMP   Glucose 82    BUN 20    Creatinine 0.92    EGFR 69.7    Sodium 137    Potassium 3.8    Chloride 99    Calcium 9.5    Total Protein 7.2    Albumin 4.1    Globulin 3.1    Total Bilirubin 0.8    Alkaline Phosphatase 59    AST (SGOT) 18    ALT (SGPT) 16    Albumin/Globulin Ratio 1.3    BUN/Creatinine Ratio 21.7    Anion Gap 8.1      CBC          10/24/2024    09:04   CBC   WBC 8.62    RBC 4.72    Hemoglobin 14.8    Hematocrit 45.1    MCV 95.6    MCH 31.4    MCHC 32.8    RDW 13.3    Platelets 330      C Reactive Protein          10/24/2024    09:04   Common Labs   C-Reactive Protein <0.30          Lab Results   Component Value Date    Sed Rate 8 10/24/2024         Procedures    Assessment / Plan      Assessment & Plan  Rheumatoid arthritis involving multiple sites with positive rheumatoid factor  Onset 2009. RF, CCP, and SANTHOSH +. Methotrexate started by primary care provider 2016.  Cimzia started 9/2022.  .  RA continues to do well with Cimzia.    She has fractured her wrist for the second time. It has healed well but there is some angulation.   Tender joint count is 0 , swollen joint count is 1 , physician global is 0 , visual analog pain scale is 3 , pt global is 2.  CDAI is 3-low disease activity.  Decrease MTX to 5 pills - if pain/ swelling occur re-increase to 7.   Labs today and Q6 week labs  Continue Cimzia.   F/u in 3 months    Orders:    CBC (No Diff)    Comprehensive Metabolic Panel    Sedimentation Rate    C-reactive Protein    CBC (No Diff); Standing    Comprehensive Metabolic Panel; Standing    Sedimentation Rate; Standing    C-reactive Protein; Standing     Primary osteoarthritis of both knees  Status post right total knee replacement 2017.  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  Reported Left knee pain today - follows with ortho next week         High risk medication use  Methotrexate.  Last labs  10/2024  Re-educated on the need for labs Q6-8 weeks to continue to monitor labs such as liver enzymes         Positive SANTHOSH (antinuclear antibody)  No evidence of associated disease.         Osteopenia of multiple sites  Followed by her PCP. She is on alendronate.         Immunosuppression due to drug therapy  Cimzia.  No infections.  Well tolerated.           Follow Up:   Return in about 3 months (around 9/4/2025) for NP's, Dr. Luna.      FREDDY Evangelista   [facility]

## 2025-05-27 NOTE — ASSESSMENT & PLAN NOTE
Onset 2009. RF, CCP, and SANTHOSH +. Methotrexate started by primary care provider 2016.  Cimzia started 9/2022.  .  RA continues to do well with Cimzia.    She has fractured her wrist for the second time. It has healed well but there is some angulation.   Tender joint count is 0 , swollen joint count is 1 , physician global is 0 , visual analog pain scale is 3 , pt global is 2.  CDAI is 3-low disease activity.  Decrease MTX to 5 pills - if pain/ swelling occur re-increase to 7.   Labs today and Q6 week labs  Continue Cimzia.   F/u in 3 months    Orders:    CBC (No Diff)    Comprehensive Metabolic Panel    Sedimentation Rate    C-reactive Protein    CBC (No Diff); Standing    Comprehensive Metabolic Panel; Standing    Sedimentation Rate; Standing    C-reactive Protein; Standing

## 2025-06-04 ENCOUNTER — TELEPHONE (OUTPATIENT)
Age: 65
End: 2025-06-04

## 2025-06-04 ENCOUNTER — OFFICE VISIT (OUTPATIENT)
Age: 65
End: 2025-06-04
Payer: COMMERCIAL

## 2025-06-04 ENCOUNTER — SPECIALTY PHARMACY (OUTPATIENT)
Age: 65
End: 2025-06-04
Payer: COMMERCIAL

## 2025-06-04 VITALS
HEIGHT: 63 IN | WEIGHT: 160.3 LBS | BODY MASS INDEX: 28.4 KG/M2 | SYSTOLIC BLOOD PRESSURE: 134 MMHG | HEART RATE: 85 BPM | DIASTOLIC BLOOD PRESSURE: 84 MMHG | TEMPERATURE: 97.2 F

## 2025-06-04 DIAGNOSIS — Z79.899 IMMUNOSUPPRESSION DUE TO DRUG THERAPY: ICD-10-CM

## 2025-06-04 DIAGNOSIS — Z79.899 HIGH RISK MEDICATION USE: Chronic | ICD-10-CM

## 2025-06-04 DIAGNOSIS — M17.0 PRIMARY OSTEOARTHRITIS OF BOTH KNEES: Chronic | ICD-10-CM

## 2025-06-04 DIAGNOSIS — M05.79 RHEUMATOID ARTHRITIS INVOLVING MULTIPLE SITES WITH POSITIVE RHEUMATOID FACTOR: Primary | Chronic | ICD-10-CM

## 2025-06-04 DIAGNOSIS — D84.821 IMMUNOSUPPRESSION DUE TO DRUG THERAPY: ICD-10-CM

## 2025-06-04 DIAGNOSIS — Z78.0 POST-MENOPAUSAL: ICD-10-CM

## 2025-06-04 DIAGNOSIS — R76.8 POSITIVE ANA (ANTINUCLEAR ANTIBODY): Chronic | ICD-10-CM

## 2025-06-04 DIAGNOSIS — M85.89 OSTEOPENIA OF MULTIPLE SITES: Chronic | ICD-10-CM

## 2025-06-04 RX ORDER — OLMESARTAN MEDOXOMIL AND HYDROCHLOROTHIAZIDE 20/12.5 20; 12.5 MG/1; MG/1
1 TABLET ORAL DAILY
COMMUNITY
Start: 2025-05-30

## 2025-06-04 RX ORDER — CERTOLIZUMAB PEGOL 200 MG/ML
400 INJECTION, SOLUTION SUBCUTANEOUS
Qty: 2 ML | Refills: 2 | Status: SHIPPED | OUTPATIENT
Start: 2025-06-04

## 2025-06-04 RX ORDER — CELECOXIB 200 MG/1
1 CAPSULE ORAL DAILY
COMMUNITY
Start: 2025-02-12 | End: 2025-06-04 | Stop reason: SDUPTHER

## 2025-06-04 RX ORDER — FOLIC ACID 1 MG/1
1 TABLET ORAL DAILY
Qty: 30 TABLET | Refills: 3 | Status: SHIPPED | OUTPATIENT
Start: 2025-06-04

## 2025-06-04 RX ORDER — CELECOXIB 200 MG/1
200 CAPSULE ORAL DAILY
Qty: 30 CAPSULE | Refills: 5 | Status: SHIPPED | OUTPATIENT
Start: 2025-06-04

## 2025-06-04 RX ORDER — METHOTREXATE 2.5 MG/1
12.5 TABLET ORAL WEEKLY
Qty: 30 TABLET | Refills: 3 | Status: SHIPPED | OUTPATIENT
Start: 2025-06-04

## 2025-06-04 NOTE — TELEPHONE ENCOUNTER
Can we see if we can get labs from patients family practice provider?  [facility] is the name of the facility out of [facility]   Phone: [phone]

## 2025-06-09 ENCOUNTER — SPECIALTY PHARMACY (OUTPATIENT)
Age: 65
End: 2025-06-09
Payer: COMMERCIAL

## 2025-06-09 NOTE — TELEPHONE ENCOUNTER
I called PCP's office and spoke with Anu.  They do not have a fax machine, but she will put labs in the mail to us. -Deepika Campos, TARAA

## 2025-06-17 LAB
ALBUMIN SERPL-MCNC: 4.5 G/DL (ref 3.9–4.9)
ALP SERPL-CCNC: 68 IU/L (ref 44–121)
ALT SERPL-CCNC: 26 IU/L (ref 0–32)
AST SERPL-CCNC: 26 IU/L (ref 0–40)
BILIRUB SERPL-MCNC: 0.8 MG/DL (ref 0–1.2)
BUN SERPL-MCNC: 19 MG/DL (ref 8–27)
BUN/CREAT SERPL: 23 (ref 12–28)
CALCIUM SERPL-MCNC: 9.9 MG/DL (ref 8.7–10.3)
CHLORIDE SERPL-SCNC: 99 MMOL/L (ref 96–106)
CO2 SERPL-SCNC: 22 MMOL/L (ref 20–29)
CREAT SERPL-MCNC: 0.84 MG/DL (ref 0.57–1)
CRP SERPL-MCNC: 2 MG/L (ref 0–10)
EGFRCR SERPLBLD CKD-EPI 2021: 78 ML/MIN/1.73
ERYTHROCYTE [DISTWIDTH] IN BLOOD BY AUTOMATED COUNT: 13.6 % (ref 11.7–15.4)
ERYTHROCYTE [SEDIMENTATION RATE] IN BLOOD BY WESTERGREN METHOD: 17 MM/HR (ref 0–40)
GLOBULIN SER CALC-MCNC: 2.8 G/DL (ref 1.5–4.5)
GLUCOSE SERPL-MCNC: 87 MG/DL (ref 70–99)
HCT VFR BLD AUTO: 47.3 % (ref 34–46.6)
HGB BLD-MCNC: 15.2 G/DL (ref 11.1–15.9)
MCH RBC QN AUTO: 31.9 PG (ref 26.6–33)
MCHC RBC AUTO-ENTMCNC: 32.1 G/DL (ref 31.5–35.7)
MCV RBC AUTO: 99 FL (ref 79–97)
PLATELET # BLD AUTO: 357 X10E3/UL (ref 150–450)
POTASSIUM SERPL-SCNC: 4 MMOL/L (ref 3.5–5.2)
PROT SERPL-MCNC: 7.3 G/DL (ref 6–8.5)
RBC # BLD AUTO: 4.76 X10E6/UL (ref 3.77–5.28)
SODIUM SERPL-SCNC: 138 MMOL/L (ref 134–144)
WBC # BLD AUTO: 10 X10E3/UL (ref 3.4–10.8)

## 2025-06-27 RX ORDER — METHOTREXATE 2.5 MG/1
12.5 TABLET ORAL WEEKLY
Qty: 90 TABLET | Refills: 0 | Status: SHIPPED | OUTPATIENT
Start: 2025-06-27